# Patient Record
Sex: FEMALE | Race: OTHER | HISPANIC OR LATINO
[De-identification: names, ages, dates, MRNs, and addresses within clinical notes are randomized per-mention and may not be internally consistent; named-entity substitution may affect disease eponyms.]

---

## 2019-04-25 ENCOUNTER — RESULT REVIEW (OUTPATIENT)
Age: 74
End: 2019-04-25

## 2019-05-16 PROBLEM — Z00.00 ENCOUNTER FOR PREVENTIVE HEALTH EXAMINATION: Status: ACTIVE | Noted: 2019-05-16

## 2019-05-20 ENCOUNTER — APPOINTMENT (OUTPATIENT)
Dept: GYNECOLOGIC ONCOLOGY | Facility: CLINIC | Age: 74
End: 2019-05-20
Payer: MEDICARE

## 2019-05-20 VITALS
HEIGHT: 62 IN | DIASTOLIC BLOOD PRESSURE: 78 MMHG | BODY MASS INDEX: 29.81 KG/M2 | SYSTOLIC BLOOD PRESSURE: 148 MMHG | WEIGHT: 162 LBS | HEART RATE: 89 BPM

## 2019-05-20 DIAGNOSIS — E11.9 TYPE 2 DIABETES MELLITUS W/OUT COMPLICATIONS: ICD-10-CM

## 2019-05-20 DIAGNOSIS — Z86.19 PERSONAL HISTORY OF OTHER INFECTIOUS AND PARASITIC DISEASES: ICD-10-CM

## 2019-05-20 DIAGNOSIS — F17.200 NICOTINE DEPENDENCE, UNSPECIFIED, UNCOMPLICATED: ICD-10-CM

## 2019-05-20 DIAGNOSIS — Z86.69 PERSONAL HISTORY OF OTHER DISEASES OF THE NERVOUS SYSTEM AND SENSE ORGANS: ICD-10-CM

## 2019-05-20 PROCEDURE — 99205 OFFICE O/P NEW HI 60 MIN: CPT

## 2019-05-20 RX ORDER — GLIPIZIDE 2.5 MG/1
TABLET ORAL
Refills: 0 | Status: ACTIVE | COMMUNITY

## 2019-05-22 LAB
BUN SERPL-MCNC: 18 MG/DL
CREAT SERPL-MCNC: 0.84 MG/DL

## 2019-05-28 ENCOUNTER — OTHER (OUTPATIENT)
Age: 74
End: 2019-05-28

## 2019-05-31 ENCOUNTER — OUTPATIENT (OUTPATIENT)
Dept: OUTPATIENT SERVICES | Facility: HOSPITAL | Age: 74
LOS: 1 days | End: 2019-05-31
Payer: MEDICARE

## 2019-05-31 VITALS
OXYGEN SATURATION: 95 % | TEMPERATURE: 98 F | HEIGHT: 61 IN | SYSTOLIC BLOOD PRESSURE: 140 MMHG | DIASTOLIC BLOOD PRESSURE: 80 MMHG | RESPIRATION RATE: 16 BRPM | WEIGHT: 162.92 LBS | HEART RATE: 81 BPM

## 2019-05-31 DIAGNOSIS — Z98.891 HISTORY OF UTERINE SCAR FROM PREVIOUS SURGERY: Chronic | ICD-10-CM

## 2019-05-31 DIAGNOSIS — C54.1 MALIGNANT NEOPLASM OF ENDOMETRIUM: ICD-10-CM

## 2019-05-31 DIAGNOSIS — E11.9 TYPE 2 DIABETES MELLITUS WITHOUT COMPLICATIONS: ICD-10-CM

## 2019-05-31 DIAGNOSIS — Z92.89 PERSONAL HISTORY OF OTHER MEDICAL TREATMENT: Chronic | ICD-10-CM

## 2019-05-31 LAB
ALBUMIN SERPL ELPH-MCNC: 4.3 G/DL — SIGNIFICANT CHANGE UP (ref 3.3–5)
ALP SERPL-CCNC: 45 U/L — SIGNIFICANT CHANGE UP (ref 40–120)
ALT FLD-CCNC: 15 U/L — SIGNIFICANT CHANGE UP (ref 4–33)
ANION GAP SERPL CALC-SCNC: 15 MMO/L — HIGH (ref 7–14)
AST SERPL-CCNC: 15 U/L — SIGNIFICANT CHANGE UP (ref 4–32)
BILIRUB SERPL-MCNC: 0.4 MG/DL — SIGNIFICANT CHANGE UP (ref 0.2–1.2)
BLD GP AB SCN SERPL QL: NEGATIVE — SIGNIFICANT CHANGE UP
BUN SERPL-MCNC: 16 MG/DL — SIGNIFICANT CHANGE UP (ref 7–23)
CALCIUM SERPL-MCNC: 10.1 MG/DL — SIGNIFICANT CHANGE UP (ref 8.4–10.5)
CHLORIDE SERPL-SCNC: 96 MMOL/L — LOW (ref 98–107)
CO2 SERPL-SCNC: 23 MMOL/L — SIGNIFICANT CHANGE UP (ref 22–31)
CREAT SERPL-MCNC: 0.76 MG/DL — SIGNIFICANT CHANGE UP (ref 0.5–1.3)
GLUCOSE SERPL-MCNC: 159 MG/DL — HIGH (ref 70–99)
HBA1C BLD-MCNC: 6.7 % — HIGH (ref 4–5.6)
HCT VFR BLD CALC: 48.1 % — HIGH (ref 34.5–45)
HGB BLD-MCNC: 16 G/DL — HIGH (ref 11.5–15.5)
MCHC RBC-ENTMCNC: 31.9 PG — SIGNIFICANT CHANGE UP (ref 27–34)
MCHC RBC-ENTMCNC: 33.3 % — SIGNIFICANT CHANGE UP (ref 32–36)
MCV RBC AUTO: 95.8 FL — SIGNIFICANT CHANGE UP (ref 80–100)
NRBC # FLD: 0 K/UL — SIGNIFICANT CHANGE UP (ref 0–0)
PLATELET # BLD AUTO: 200 K/UL — SIGNIFICANT CHANGE UP (ref 150–400)
PMV BLD: 10.7 FL — SIGNIFICANT CHANGE UP (ref 7–13)
POTASSIUM SERPL-MCNC: 4.3 MMOL/L — SIGNIFICANT CHANGE UP (ref 3.5–5.3)
POTASSIUM SERPL-SCNC: 4.3 MMOL/L — SIGNIFICANT CHANGE UP (ref 3.5–5.3)
PROT SERPL-MCNC: 8.8 G/DL — HIGH (ref 6–8.3)
RBC # BLD: 5.02 M/UL — SIGNIFICANT CHANGE UP (ref 3.8–5.2)
RBC # FLD: 12.9 % — SIGNIFICANT CHANGE UP (ref 10.3–14.5)
RH IG SCN BLD-IMP: POSITIVE — SIGNIFICANT CHANGE UP
SODIUM SERPL-SCNC: 134 MMOL/L — LOW (ref 135–145)
WBC # BLD: 7.67 K/UL — SIGNIFICANT CHANGE UP (ref 3.8–10.5)
WBC # FLD AUTO: 7.67 K/UL — SIGNIFICANT CHANGE UP (ref 3.8–10.5)

## 2019-05-31 PROCEDURE — 93010 ELECTROCARDIOGRAM REPORT: CPT

## 2019-05-31 RX ORDER — SODIUM CHLORIDE 9 MG/ML
1000 INJECTION, SOLUTION INTRAVENOUS
Refills: 0 | Status: DISCONTINUED | OUTPATIENT
Start: 2019-06-11 | End: 2019-06-26

## 2019-05-31 NOTE — H&P PST ADULT - HISTORY OF PRESENT ILLNESS
74 y.o. female , with hx of diabetes, c/o postmenopausal bleeding in 04/2019 x 3 days, s/p GYN eval and biopsy, diagnosed with endometrial cancer, pt denies further episodes of bleeding, pelvic pain, weight loss or fatigue, presents to PST for evaluation for Robotic Assisted Total Laparoscopic Hysterectomy, Bilateral Salpingo Oophorectomy, Pelvic and Para Aortic Lymph Node Dissection, Omentectomy on 19

## 2019-05-31 NOTE — H&P PST ADULT - NSICDXPROBLEM_GEN_ALL_CORE_FT
PROBLEM DIAGNOSES  Problem: Endometrial cancer  Assessment and Plan: pt scheduled for Robotic Assisted Total Laparoscopic Hysterectomy, Bilateral Salpingo Oophorectomy, Pelvic and Para Aortic Lymph Node Dissection, Omentectomy on 06/11/19  Preop instructions provided. Pt verbalized understanding.   Pepcid for GI prophylaxis with written and verbal instruction provided    written and verbal instructions with teach back on chlorhexidine shampoo provided,  pt verbalized understanding with returned demonstration   s/p med eval 05/30/19 as per surgeon request, copy requested    Problem: Diabetes mellitus  Assessment and Plan: OR booking notified  pt instructed to hold Glipizide on the morning of the surgery

## 2019-05-31 NOTE — H&P PST ADULT - NEGATIVE MUSCULOSKELETAL SYMPTOMS
no muscle weakness/no back pain/no myalgia/no muscle cramps/no neck pain/no stiffness/no joint swelling/no arthritis

## 2019-05-31 NOTE — H&P PST ADULT - MUSCULOSKELETAL
detailed exam no joint erythema/normal strength/no calf tenderness/no joint swelling/ROM intact/no joint warmth details…

## 2019-06-10 ENCOUNTER — TRANSCRIPTION ENCOUNTER (OUTPATIENT)
Age: 74
End: 2019-06-10

## 2019-06-10 PROBLEM — E11.9 TYPE 2 DIABETES MELLITUS: Status: ACTIVE | Noted: 2019-06-10

## 2019-06-10 PROBLEM — Z86.69 HISTORY OF CATARACT: Status: RESOLVED | Noted: 2019-06-10 | Resolved: 2019-06-10

## 2019-06-10 PROBLEM — Z86.19 HISTORY OF HEPATITIS C VIRUS INFECTION: Status: RESOLVED | Noted: 2019-06-10 | Resolved: 2019-06-10

## 2019-06-10 NOTE — ASU PATIENT PROFILE, ADULT - PMH
Diabetes mellitus    Endometrial cancer    Hepatitis C  ~2017, treated Diabetes mellitus    Endometrial cancer    Hepatitis C  ~2017, treated  Smoker

## 2019-06-11 ENCOUNTER — RESULT REVIEW (OUTPATIENT)
Age: 74
End: 2019-06-11

## 2019-06-11 ENCOUNTER — APPOINTMENT (OUTPATIENT)
Dept: GYNECOLOGIC ONCOLOGY | Facility: HOSPITAL | Age: 74
End: 2019-06-11

## 2019-06-11 ENCOUNTER — OUTPATIENT (OUTPATIENT)
Dept: OUTPATIENT SERVICES | Facility: HOSPITAL | Age: 74
LOS: 1 days | Discharge: HOME | End: 2019-06-11
Payer: MEDICARE

## 2019-06-11 VITALS
SYSTOLIC BLOOD PRESSURE: 138 MMHG | TEMPERATURE: 98 F | HEART RATE: 78 BPM | OXYGEN SATURATION: 97 % | DIASTOLIC BLOOD PRESSURE: 78 MMHG | RESPIRATION RATE: 16 BRPM

## 2019-06-11 VITALS
TEMPERATURE: 98 F | HEIGHT: 61 IN | DIASTOLIC BLOOD PRESSURE: 76 MMHG | OXYGEN SATURATION: 100 % | RESPIRATION RATE: 16 BRPM | WEIGHT: 162.92 LBS | HEART RATE: 75 BPM | SYSTOLIC BLOOD PRESSURE: 141 MMHG

## 2019-06-11 DIAGNOSIS — C54.1 MALIGNANT NEOPLASM OF ENDOMETRIUM: ICD-10-CM

## 2019-06-11 DIAGNOSIS — Z92.89 PERSONAL HISTORY OF OTHER MEDICAL TREATMENT: Chronic | ICD-10-CM

## 2019-06-11 DIAGNOSIS — Z98.891 HISTORY OF UTERINE SCAR FROM PREVIOUS SURGERY: Chronic | ICD-10-CM

## 2019-06-11 LAB
BASE EXCESS BLDA CALC-SCNC: -1.8 MMOL/L — SIGNIFICANT CHANGE UP
BASE EXCESS BLDA CALC-SCNC: -3.2 MMOL/L — SIGNIFICANT CHANGE UP
BASOPHILS # BLD AUTO: 0.06 K/UL — SIGNIFICANT CHANGE UP (ref 0–0.2)
BASOPHILS NFR BLD AUTO: 0.6 % — SIGNIFICANT CHANGE UP (ref 0–2)
CA-I BLDA-SCNC: 1.16 MMOL/L — SIGNIFICANT CHANGE UP (ref 1.15–1.29)
CA-I BLDA-SCNC: 1.19 MMOL/L — SIGNIFICANT CHANGE UP (ref 1.15–1.29)
EOSINOPHIL # BLD AUTO: 0.04 K/UL — SIGNIFICANT CHANGE UP (ref 0–0.5)
EOSINOPHIL NFR BLD AUTO: 0.4 % — SIGNIFICANT CHANGE UP (ref 0–6)
GLUCOSE BLDA-MCNC: 145 MG/DL — HIGH (ref 70–99)
GLUCOSE BLDA-MCNC: 160 MG/DL — HIGH (ref 70–99)
GLUCOSE BLDC GLUCOMTR-MCNC: 160 MG/DL — HIGH (ref 70–99)
HCO3 BLDA-SCNC: 22 MMOL/L — SIGNIFICANT CHANGE UP (ref 22–26)
HCO3 BLDA-SCNC: 24 MMOL/L — SIGNIFICANT CHANGE UP (ref 22–26)
HCT VFR BLD CALC: 43.7 % — SIGNIFICANT CHANGE UP (ref 34.5–45)
HCT VFR BLDA CALC: 46 % — SIGNIFICANT CHANGE UP (ref 34.5–46.5)
HCT VFR BLDA CALC: 46.6 % — HIGH (ref 34.5–46.5)
HGB BLD-MCNC: 14.8 G/DL — SIGNIFICANT CHANGE UP (ref 11.5–15.5)
HGB BLDA-MCNC: 15 G/DL — SIGNIFICANT CHANGE UP (ref 11.5–15.5)
HGB BLDA-MCNC: 15.2 G/DL — SIGNIFICANT CHANGE UP (ref 11.5–15.5)
IMM GRANULOCYTES NFR BLD AUTO: 0.2 % — SIGNIFICANT CHANGE UP (ref 0–1.5)
LYMPHOCYTES # BLD AUTO: 1.89 K/UL — SIGNIFICANT CHANGE UP (ref 1–3.3)
LYMPHOCYTES # BLD AUTO: 18.6 % — SIGNIFICANT CHANGE UP (ref 13–44)
MCHC RBC-ENTMCNC: 31.9 PG — SIGNIFICANT CHANGE UP (ref 27–34)
MCHC RBC-ENTMCNC: 33.9 % — SIGNIFICANT CHANGE UP (ref 32–36)
MCV RBC AUTO: 94.2 FL — SIGNIFICANT CHANGE UP (ref 80–100)
MONOCYTES # BLD AUTO: 0.49 K/UL — SIGNIFICANT CHANGE UP (ref 0–0.9)
MONOCYTES NFR BLD AUTO: 4.8 % — SIGNIFICANT CHANGE UP (ref 2–14)
NEUTROPHILS # BLD AUTO: 7.65 K/UL — HIGH (ref 1.8–7.4)
NEUTROPHILS NFR BLD AUTO: 75.4 % — SIGNIFICANT CHANGE UP (ref 43–77)
NRBC # FLD: 0 K/UL — SIGNIFICANT CHANGE UP (ref 0–0)
PCO2 BLDA: 33 MMHG — SIGNIFICANT CHANGE UP (ref 32–48)
PCO2 BLDA: 40 MMHG — SIGNIFICANT CHANGE UP (ref 32–48)
PH BLDA: 7.35 PH — SIGNIFICANT CHANGE UP (ref 7.35–7.45)
PH BLDA: 7.43 PH — SIGNIFICANT CHANGE UP (ref 7.35–7.45)
PLATELET # BLD AUTO: 175 K/UL — SIGNIFICANT CHANGE UP (ref 150–400)
PMV BLD: 10.4 FL — SIGNIFICANT CHANGE UP (ref 7–13)
PO2 BLDA: 254 MMHG — HIGH (ref 83–108)
PO2 BLDA: 261 MMHG — HIGH (ref 83–108)
POTASSIUM BLDA-SCNC: 4 MMOL/L — SIGNIFICANT CHANGE UP (ref 3.4–4.5)
POTASSIUM BLDA-SCNC: 4.2 MMOL/L — SIGNIFICANT CHANGE UP (ref 3.4–4.5)
RBC # BLD: 4.64 M/UL — SIGNIFICANT CHANGE UP (ref 3.8–5.2)
RBC # FLD: 12.6 % — SIGNIFICANT CHANGE UP (ref 10.3–14.5)
RH IG SCN BLD-IMP: POSITIVE — SIGNIFICANT CHANGE UP
SAO2 % BLDA: 99.6 % — HIGH (ref 95–99)
SAO2 % BLDA: 99.8 % — HIGH (ref 95–99)
SODIUM BLDA-SCNC: 135 MMOL/L — LOW (ref 136–146)
SODIUM BLDA-SCNC: 135 MMOL/L — LOW (ref 136–146)
WBC # BLD: 10.15 K/UL — SIGNIFICANT CHANGE UP (ref 3.8–10.5)
WBC # FLD AUTO: 10.15 K/UL — SIGNIFICANT CHANGE UP (ref 3.8–10.5)

## 2019-06-11 PROCEDURE — 88112 CYTOPATH CELL ENHANCE TECH: CPT | Mod: 26

## 2019-06-11 PROCEDURE — 38572 LAPAROSCOPY LYMPHADENECTOMY: CPT | Mod: GC

## 2019-06-11 PROCEDURE — 88309 TISSUE EXAM BY PATHOLOGIST: CPT | Mod: 26

## 2019-06-11 PROCEDURE — 88342 IMHCHEM/IMCYTCHM 1ST ANTB: CPT | Mod: 26

## 2019-06-11 PROCEDURE — S2900 ROBOTIC SURGICAL SYSTEM: CPT | Mod: NC

## 2019-06-11 PROCEDURE — 88341 IMHCHEM/IMCYTCHM EA ADD ANTB: CPT | Mod: 26

## 2019-06-11 PROCEDURE — 88305 TISSUE EXAM BY PATHOLOGIST: CPT | Mod: 26

## 2019-06-11 PROCEDURE — 58571 TLH W/T/O 250 G OR LESS: CPT | Mod: GC

## 2019-06-11 RX ORDER — SODIUM CHLORIDE 9 MG/ML
1000 INJECTION, SOLUTION INTRAVENOUS
Refills: 0 | Status: DISCONTINUED | OUTPATIENT
Start: 2019-06-11 | End: 2019-06-26

## 2019-06-11 RX ORDER — OXYCODONE HYDROCHLORIDE 5 MG/1
1 TABLET ORAL
Qty: 12 | Refills: 0
Start: 2019-06-11 | End: 2019-06-13

## 2019-06-11 RX ORDER — FENTANYL CITRATE 50 UG/ML
25 INJECTION INTRAVENOUS
Refills: 0 | Status: DISCONTINUED | OUTPATIENT
Start: 2019-06-11 | End: 2019-06-11

## 2019-06-11 RX ORDER — ONDANSETRON 8 MG/1
4 TABLET, FILM COATED ORAL ONCE
Refills: 0 | Status: DISCONTINUED | OUTPATIENT
Start: 2019-06-11 | End: 2019-06-26

## 2019-06-11 RX ORDER — OXYCODONE AND ACETAMINOPHEN 5; 325 MG/1; MG/1
1 TABLET ORAL ONCE
Refills: 0 | Status: DISCONTINUED | OUTPATIENT
Start: 2019-06-11 | End: 2019-06-11

## 2019-06-11 RX ADMIN — SODIUM CHLORIDE 125 MILLILITER(S): 9 INJECTION, SOLUTION INTRAVENOUS at 14:27

## 2019-06-11 NOTE — ASU DISCHARGE PLAN (ADULT/PEDIATRIC) - CARE PROVIDER_API CALL
Mildred Gonzalez (MD)  Gynecologic Oncology; Obstetrics and Gynecology  [facility], [address] [place] [address]  Phone: [phone]  Fax: [phone]  Follow Up Time: 2 weeks

## 2019-06-11 NOTE — BRIEF OPERATIVE NOTE - SPECIMENS
uterus, cervix, bilateral ovaries and fallopian tubes, pelvic and para-aortic lymph nodes, omental biopsy

## 2019-06-11 NOTE — ASU DISCHARGE PLAN (ADULT/PEDIATRIC) - CALL YOUR DOCTOR IF YOU HAVE ANY OF THE FOLLOWING:
Nausea and vomiting that does not stop/Inability to tolerate liquids or foods/Pain not relieved by Medications/Fever greater than (need to indicate Fahrenheit or Celsius)/Bleeding that does not stop

## 2019-06-11 NOTE — ASU DISCHARGE PLAN (ADULT/PEDIATRIC) - ACTIVITY LEVEL
No excercise/No tub baths/Nothing per vagina/No douching/Nothing per rectum/No intercourse/No heavy lifting/6 weeks/No tampons

## 2019-06-11 NOTE — PROGRESS NOTE ADULT - SUBJECTIVE AND OBJECTIVE BOX
PA GYN/ONC POST OP NOTE:    Pt seen in PACU, resting comfortably, without any complaints. Pt becoming more awake now and requesting something to drink. Eagle catheter was just D/C'd and is DTV and then for possible D/C home later.    Vital Signs Last 24 Hrs  T(C): 36.6 (11 Jun 2019 14:45), Max: 36.6 (11 Jun 2019 06:42)  T(F): 97.8 (11 Jun 2019 14:45), Max: 97.9 (11 Jun 2019 06:42)  HR: 86 (11 Jun 2019 15:45) (75 - 102)  BP: 134/63 (11 Jun 2019 15:45) (112/47 - 161/68)  BP(mean): 81 (11 Jun 2019 15:45) (63 - 90)  RR: 15 (11 Jun 2019 15:45) (11 - 17)  SpO2: 98% (11 Jun 2019 15:45) (93% - 100%)    U/O:    I&O's Detail    11 Jun 2019 07:01  -  11 Jun 2019 15:57  --------------------------------------------------------  IN:    lactated ringers.: 375 mL  Total IN: 375 mL    OUT:    Indwelling Catheter - Urethral: 300 mL  Total OUT: 300 mL    Total NET: 75 mL    PHYSICAL EXAM:  CHEST/LUNG: CTA B/L  HEART: S1S2 RRR  ABDOMEN: Soft, Appropriately tender   INCISION: Scope sites C/D/I  EXTREMITIES: NT B/L, Pt has Venodynes for DVT ppx     LABS:                        14.8   10.15 )-----------( 175      ( 11 Jun 2019 15:06 )             43.7     MEDICATIONS  (STANDING):  lactated ringers. 1000 milliLiter(s) (30 mL/Hr) IV Continuous <Continuous>  lactated ringers. 1000 milliLiter(s) (125 mL/Hr) IV Continuous <Continuous>    MEDICATIONS  (PRN):  fentaNYL    Injectable 25 MICROGram(s) IV Push every 5 minutes PRN Moderate Pain (4 - 6)  ondansetron Injectable 4 milliGRAM(s) IV Push once PRN Nausea and/or Vomiting  oxyCODONE    5 mG/acetaminophen 325 mG 1 Tablet(s) Oral once PRN Moderate Pain (4 - 6) PA GYN/ONC POST OP NOTE:    Pt seen in PACU, resting comfortably, without any complaints. Pt becoming more awake now and requesting something to drink. Eagle catheter was just D/C'd and is DTV and then for possible D/C home later.    Vital Signs Last 24 Hrs  T(C): 36.6 (11 Jun 2019 14:45), Max: 36.6 (11 Jun 2019 06:42)  T(F): 97.8 (11 Jun 2019 14:45), Max: 97.9 (11 Jun 2019 06:42)  HR: 86 (11 Jun 2019 15:45) (75 - 102)  BP: 134/63 (11 Jun 2019 15:45) (112/47 - 161/68)  BP(mean): 81 (11 Jun 2019 15:45) (63 - 90)  RR: 15 (11 Jun 2019 15:45) (11 - 17)  SpO2: 98% (11 Jun 2019 15:45) (93% - 100%)    U/O:    I&O's Detail    11 Jun 2019 07:01  -  11 Jun 2019 15:57  --------------------------------------------------------  IN:    lactated ringers.: 375 mL  Total IN: 375 mL    OUT:    Indwelling Catheter - Urethral: 300 mL  Total OUT: 300 mL    Total NET: 75 mL    PHYSICAL EXAM:  CHEST/LUNG: CTA B/L  HEART: S1S2 RRR  ABDOMEN: Soft, Appropriately tender   INCISION: Scope sites C/D/I  PELVIC: No vaginal bleeding  EXTREMITIES: NT B/L, Pt has Venodynes for DVT ppx     LABS:                        14.8   10.15 )-----------( 175      ( 11 Jun 2019 15:06 )             43.7     MEDICATIONS  (STANDING):  lactated ringers. 1000 milliLiter(s) (30 mL/Hr) IV Continuous <Continuous>  lactated ringers. 1000 milliLiter(s) (125 mL/Hr) IV Continuous <Continuous>    MEDICATIONS  (PRN):  fentaNYL    Injectable 25 MICROGram(s) IV Push every 5 minutes PRN Moderate Pain (4 - 6)  ondansetron Injectable 4 milliGRAM(s) IV Push once PRN Nausea and/or Vomiting  oxyCODONE    5 mG/acetaminophen 325 mG 1 Tablet(s) Oral once PRN Moderate Pain (4 - 6)

## 2019-06-11 NOTE — ASU DISCHARGE PLAN (ADULT/PEDIATRIC) - PROCEDURE
exam under anesthesia, robotic-assisted total laproscopic hysterectomy, bilateral salpingooopherectomy, paraarotic and pelvic lymph node dissection, omentectomy, repair of vaginal laceration

## 2019-06-11 NOTE — BRIEF OPERATIVE NOTE - NSICDXBRIEFPROCEDURE_GEN_ALL_CORE_FT
PROCEDURES:  Robot-assisted total hysterectomy with lymphadenectomy 11-Jun-2019 12:41:14  Mildred Gonzalez

## 2019-06-11 NOTE — BRIEF OPERATIVE NOTE - OPERATION/FINDINGS
8 week size uterus, grossly normal ovaries and fallopian tubes, mildly enlarged left obturator lymph nodes, no enlarged right pelvic or para-aortic lymph nodes, appendix and upper abdomen unremarkable

## 2019-06-11 NOTE — ASU DISCHARGE PLAN (ADULT/PEDIATRIC) - ASU DC SPECIAL INSTRUCTIONSFT
Follow up with your doctor at your appointment on 6/24/19 at 11am.     Regular diet. Resume normal activity as tolerated. No heavy lifting, driving, or strenuous activity for 6 weeks. Do not put anything in the vagina for 2 weeks--no douching, tampons, or intercourse. No tub baths for 2 weeks. Call your doctor with any signs and symptoms of infection such as fever, chills, nausea or vomiting. Call your doctor with redness or swelling at the incision site, fluid leakage or wound separation. Call your doctor if you're unable to tolerate food or have difficulty urinating. Call your doctor if you have pain that is not relieved by your prescribed medications. Notify your doctor with any other concerns. Follow up with Dr. Gonzalez in 2 weeks.

## 2019-06-11 NOTE — PROGRESS NOTE ADULT - ASSESSMENT
A/P: 75 Y/O S/P: Robotic TLH, BSO, PPALND, BILL, Vaginal Laceration Repair  Pt stable, and for D/C home once ambulates, tolerating some regular diet, voiding adequately and meeting PACU criteria.

## 2019-06-14 LAB — NON-GYNECOLOGICAL CYTOLOGY STUDY: SIGNIFICANT CHANGE UP

## 2019-06-24 ENCOUNTER — APPOINTMENT (OUTPATIENT)
Dept: GYNECOLOGIC ONCOLOGY | Facility: CLINIC | Age: 74
End: 2019-06-24
Payer: MEDICARE

## 2019-06-24 VITALS
DIASTOLIC BLOOD PRESSURE: 77 MMHG | HEART RATE: 76 BPM | HEIGHT: 62 IN | BODY MASS INDEX: 30.55 KG/M2 | TEMPERATURE: 98.2 F | SYSTOLIC BLOOD PRESSURE: 145 MMHG | WEIGHT: 166 LBS

## 2019-06-24 DIAGNOSIS — Z71.9 COUNSELING, UNSPECIFIED: ICD-10-CM

## 2019-06-24 PROBLEM — B19.20 UNSPECIFIED VIRAL HEPATITIS C WITHOUT HEPATIC COMA: Chronic | Status: ACTIVE | Noted: 2019-05-31

## 2019-06-24 PROBLEM — E11.9 TYPE 2 DIABETES MELLITUS WITHOUT COMPLICATIONS: Chronic | Status: ACTIVE | Noted: 2019-05-31

## 2019-06-24 PROBLEM — C54.1 MALIGNANT NEOPLASM OF ENDOMETRIUM: Chronic | Status: ACTIVE | Noted: 2019-05-31

## 2019-06-24 PROBLEM — F17.200 NICOTINE DEPENDENCE, UNSPECIFIED, UNCOMPLICATED: Chronic | Status: ACTIVE | Noted: 2019-06-11

## 2019-06-24 LAB — SURGICAL PATHOLOGY STUDY: SIGNIFICANT CHANGE UP

## 2019-06-24 PROCEDURE — 99213 OFFICE O/P EST LOW 20 MIN: CPT | Mod: 24

## 2019-06-24 PROCEDURE — 99024 POSTOP FOLLOW-UP VISIT: CPT

## 2019-07-08 ENCOUNTER — OUTPATIENT (OUTPATIENT)
Dept: OUTPATIENT SERVICES | Facility: HOSPITAL | Age: 74
LOS: 1 days | Discharge: HOME | End: 2019-07-08
Payer: MEDICARE

## 2019-07-08 DIAGNOSIS — Z98.891 HISTORY OF UTERINE SCAR FROM PREVIOUS SURGERY: Chronic | ICD-10-CM

## 2019-07-08 DIAGNOSIS — Z92.89 PERSONAL HISTORY OF OTHER MEDICAL TREATMENT: Chronic | ICD-10-CM

## 2019-07-09 ENCOUNTER — EMERGENCY (EMERGENCY)
Facility: HOSPITAL | Age: 74
LOS: 1 days | Discharge: HOME | End: 2019-07-09
Attending: STUDENT IN AN ORGANIZED HEALTH CARE EDUCATION/TRAINING PROGRAM | Admitting: STUDENT IN AN ORGANIZED HEALTH CARE EDUCATION/TRAINING PROGRAM
Payer: MEDICARE

## 2019-07-09 VITALS
DIASTOLIC BLOOD PRESSURE: 58 MMHG | HEART RATE: 85 BPM | TEMPERATURE: 98 F | OXYGEN SATURATION: 98 % | SYSTOLIC BLOOD PRESSURE: 125 MMHG | RESPIRATION RATE: 18 BRPM

## 2019-07-09 VITALS
TEMPERATURE: 99 F | HEART RATE: 76 BPM | OXYGEN SATURATION: 98 % | RESPIRATION RATE: 17 BRPM | SYSTOLIC BLOOD PRESSURE: 150 MMHG | DIASTOLIC BLOOD PRESSURE: 85 MMHG

## 2019-07-09 DIAGNOSIS — Z98.891 HISTORY OF UTERINE SCAR FROM PREVIOUS SURGERY: Chronic | ICD-10-CM

## 2019-07-09 DIAGNOSIS — N39.0 URINARY TRACT INFECTION, SITE NOT SPECIFIED: ICD-10-CM

## 2019-07-09 DIAGNOSIS — Z92.89 PERSONAL HISTORY OF OTHER MEDICAL TREATMENT: Chronic | ICD-10-CM

## 2019-07-09 LAB
ALBUMIN SERPL ELPH-MCNC: 3.6 G/DL — SIGNIFICANT CHANGE UP (ref 3.3–5)
ALP SERPL-CCNC: 47 U/L — SIGNIFICANT CHANGE UP (ref 40–120)
ALT FLD-CCNC: 20 U/L — SIGNIFICANT CHANGE UP (ref 4–33)
ANION GAP SERPL CALC-SCNC: 14 MMO/L — SIGNIFICANT CHANGE UP (ref 7–14)
APPEARANCE UR: SIGNIFICANT CHANGE UP
AST SERPL-CCNC: 21 U/L — SIGNIFICANT CHANGE UP (ref 4–32)
BACTERIA # UR AUTO: SIGNIFICANT CHANGE UP
BASOPHILS # BLD AUTO: 0.07 K/UL — SIGNIFICANT CHANGE UP (ref 0–0.2)
BASOPHILS NFR BLD AUTO: 0.9 % — SIGNIFICANT CHANGE UP (ref 0–2)
BILIRUB SERPL-MCNC: 0.4 MG/DL — SIGNIFICANT CHANGE UP (ref 0.2–1.2)
BILIRUB UR-MCNC: NEGATIVE — SIGNIFICANT CHANGE UP
BLOOD UR QL VISUAL: SIGNIFICANT CHANGE UP
BUN SERPL-MCNC: 15 MG/DL — SIGNIFICANT CHANGE UP (ref 7–23)
CALCIUM SERPL-MCNC: 9.6 MG/DL — SIGNIFICANT CHANGE UP (ref 8.4–10.5)
CHLORIDE SERPL-SCNC: 96 MMOL/L — LOW (ref 98–107)
CO2 SERPL-SCNC: 23 MMOL/L — SIGNIFICANT CHANGE UP (ref 22–31)
COLOR SPEC: YELLOW — SIGNIFICANT CHANGE UP
CREAT SERPL-MCNC: 0.78 MG/DL — SIGNIFICANT CHANGE UP (ref 0.5–1.3)
EOSINOPHIL # BLD AUTO: 0.2 K/UL — SIGNIFICANT CHANGE UP (ref 0–0.5)
EOSINOPHIL NFR BLD AUTO: 2.5 % — SIGNIFICANT CHANGE UP (ref 0–6)
GLUCOSE SERPL-MCNC: 149 MG/DL — HIGH (ref 70–99)
GLUCOSE UR-MCNC: NEGATIVE — SIGNIFICANT CHANGE UP
HCT VFR BLD CALC: 44.9 % — SIGNIFICANT CHANGE UP (ref 34.5–45)
HGB BLD-MCNC: 14.7 G/DL — SIGNIFICANT CHANGE UP (ref 11.5–15.5)
HYALINE CASTS # UR AUTO: NEGATIVE — SIGNIFICANT CHANGE UP
IMM GRANULOCYTES NFR BLD AUTO: 0.4 % — SIGNIFICANT CHANGE UP (ref 0–1.5)
KETONES UR-MCNC: NEGATIVE — SIGNIFICANT CHANGE UP
LEUKOCYTE ESTERASE UR-ACNC: SIGNIFICANT CHANGE UP
LYMPHOCYTES # BLD AUTO: 2.03 K/UL — SIGNIFICANT CHANGE UP (ref 1–3.3)
LYMPHOCYTES # BLD AUTO: 25.2 % — SIGNIFICANT CHANGE UP (ref 13–44)
MCHC RBC-ENTMCNC: 31.3 PG — SIGNIFICANT CHANGE UP (ref 27–34)
MCHC RBC-ENTMCNC: 32.7 % — SIGNIFICANT CHANGE UP (ref 32–36)
MCV RBC AUTO: 95.5 FL — SIGNIFICANT CHANGE UP (ref 80–100)
MONOCYTES # BLD AUTO: 0.62 K/UL — SIGNIFICANT CHANGE UP (ref 0–0.9)
MONOCYTES NFR BLD AUTO: 7.7 % — SIGNIFICANT CHANGE UP (ref 2–14)
NEUTROPHILS # BLD AUTO: 5.09 K/UL — SIGNIFICANT CHANGE UP (ref 1.8–7.4)
NEUTROPHILS NFR BLD AUTO: 63.3 % — SIGNIFICANT CHANGE UP (ref 43–77)
NITRITE UR-MCNC: POSITIVE — HIGH
NRBC # FLD: 0 K/UL — SIGNIFICANT CHANGE UP (ref 0–0)
PH UR: 6.5 — SIGNIFICANT CHANGE UP (ref 5–8)
PLATELET # BLD AUTO: 295 K/UL — SIGNIFICANT CHANGE UP (ref 150–400)
PMV BLD: 10.4 FL — SIGNIFICANT CHANGE UP (ref 7–13)
POTASSIUM SERPL-MCNC: 3.9 MMOL/L — SIGNIFICANT CHANGE UP (ref 3.5–5.3)
POTASSIUM SERPL-SCNC: 3.9 MMOL/L — SIGNIFICANT CHANGE UP (ref 3.5–5.3)
PROT SERPL-MCNC: 9.3 G/DL — HIGH (ref 6–8.3)
PROT UR-MCNC: 20 — SIGNIFICANT CHANGE UP
RBC # BLD: 4.7 M/UL — SIGNIFICANT CHANGE UP (ref 3.8–5.2)
RBC # FLD: 12.3 % — SIGNIFICANT CHANGE UP (ref 10.3–14.5)
RBC CASTS # UR COMP ASSIST: SIGNIFICANT CHANGE UP (ref 0–?)
SODIUM SERPL-SCNC: 133 MMOL/L — LOW (ref 135–145)
SP GR SPEC: 1.01 — SIGNIFICANT CHANGE UP (ref 1–1.04)
SQUAMOUS # UR AUTO: SIGNIFICANT CHANGE UP
UROBILINOGEN FLD QL: NORMAL — SIGNIFICANT CHANGE UP
WBC # BLD: 8.04 K/UL — SIGNIFICANT CHANGE UP (ref 3.8–10.5)
WBC # FLD AUTO: 8.04 K/UL — SIGNIFICANT CHANGE UP (ref 3.8–10.5)
WBC UR QL: >50 — HIGH (ref 0–?)

## 2019-07-09 PROCEDURE — 99283 EMERGENCY DEPT VISIT LOW MDM: CPT

## 2019-07-09 RX ORDER — CEPHALEXIN 500 MG
1 CAPSULE ORAL
Qty: 10 | Refills: 0
Start: 2019-07-09 | End: 2019-07-13

## 2019-07-09 RX ORDER — AZTREONAM 2 G
1 VIAL (EA) INJECTION
Qty: 14 | Refills: 0
Start: 2019-07-09 | End: 2019-07-15

## 2019-07-09 NOTE — CONSULT NOTE ADULT - SUBJECTIVE AND OBJECTIVE BOX
R2 GYN ONC CONSULT NOTE    74y with 1A G3 endometrial cancer s/p RA Total Laparoscopic Hysterectomy Bilateral Salpingo-oophorectomy, pelvic and para-aortic lymph node dissection and omentectomy  with several days of subjective fevers and urinary frequency. Febrile 3d ago to 100.4, otherwise no objective fevers. Afebrile here. Associated with weakness, lightheadedness. No chest pain, shortness of breath, cough, calf pain. No abdominal pain, vaginal discharge, vaginal bleeding. No dysuria or hematuria. No diarrhea or constipation. Patient has initial appointment with radiation oncologist (Dr. Laura) .    OB/GYN HISTORY:     Surgical History:    History of   History of endometrial biopsy  hysterectomy as above.      Past Medical History:   Smoker  Hepatitis C  Endometrial cancer  Diabetes mellitus      No Known Allergies          FAMILY HISTORY:  No pertinent family history in first degree relatives      Social History:     REVIEW OF SYSTEMS:    CONSTITUTIONAL: no weight loss  EYES: No eye pain, visual disturbances, or discharge  ENMT:  No difficulty hearing, tinnitus, vertigo; No sinus or throat pain  NECK: No pain or stiffness  BREASTS: No pain, masses, or nipple discharge  RESPIRATORY: No cough, wheezing, chills or hemoptysis; No shortness of breath  CARDIOVASCULAR: No chest pain, palpitations, dizziness, or leg swelling  GASTROINTESTINAL: No abdominal or epigastric pain. No nausea, vomiting, or hematemesis; No diarrhea or constipation. No melena or hematochezia.  GENITOURINARY: No dysuria, hematuria, or incontinence  NEUROLOGICAL: No headaches, memory loss, loss of strength, numbness, or tremors  SKIN: No itching, burning, rashes, or lesions   LYMPH NODES: No enlarged glands  ENDOCRINE: No heat or cold intolerance; No hair loss  MUSCULOSKELETAL: No joint pain or swelling; No muscle, back, or extremity pain  PSYCHIATRIC: No depression, anxiety, mood swings, or difficulty sleeping  HEME/LYMPH: No easy bruising, or bleeding gums  ALLERY AND IMMUNOLOGIC: No hives or eczema      MEDICATIONS  (STANDING):    MEDICATIONS  (PRN):      OBJECTIVE FINDINGS:    Vital Signs Last 24 Hrs  T(C): 37.3 (2019 21:09), Max: 37.3 (2019 21:09)  T(F): 99.2 (2019 21:09), Max: 99.2 (2019 21:09)  HR: 76 (2019 21:09) (76 - 85)  BP: 150/85 (2019 21:09) (125/58 - 150/85)  BP(mean): --  RR: 17 (2019 21:09) (17 - 18)  SpO2: 98% (2019 21:09) (98% - 98%)     PE:  Gen: Comfortable, NAD  CV: RRR  Pulm: CTAB  Abd: Soft, NT. Laproscopic port sites well healed  Ext: No edema or tenderness bilaterally  Spec Exam: deferred with intent    LABS:                        14.7   8.04  )-----------( 295      ( 2019 19:50 )             44.9     07-09    133<L>  |  96<L>  |  15  ----------------------------<  149<H>  3.9   |  23  |  0.78    Ca    9.6      2019 19:50    TPro  9.3<H>  /  Alb  3.6  /  TBili  0.4  /  DBili  x   /  AST  21  /  ALT  20  /  AlkPhos  47  07-09      Urinalysis Basic - ( 2019 21:46 )    Color: YELLOW / Appearance: Lt TURBID / S.014 / pH: 6.5  Gluc: NEGATIVE / Ketone: NEGATIVE  / Bili: NEGATIVE / Urobili: NORMAL   Blood: SMALL / Protein: 20 / Nitrite: POSITIVE   Leuk Esterase: LARGE / RBC: 0-2 / WBC >50   Sq Epi: FEW / Non Sq Epi: x / Bacteria: FEW        VWhite PGY-2

## 2019-07-09 NOTE — ED PROVIDER NOTE - CLINICAL SUMMARY MEDICAL DECISION MAKING FREE TEXT BOX
74F s/p PA 1 month ago for endometrial Ca now with subjective fevers (Tm 100.4 at home). Afebrile here, no fevers at home with temp range . Will check basic labs and ua, no clinical evidence of PNA. OB Gyn to see patient (called her doctor's office, they sent resident to see patient. plan anticipated dc

## 2019-07-09 NOTE — CONSULT NOTE ADULT - PROBLEM SELECTOR RECOMMENDATION 9
- Recommend bactrim rather than keflex for UTI--Rx sent to pharmacy and patient contacted by phone to discuss plan. Patient agrees with plan, understands need to take all antibiotics as prescribed, and has an appointment with rad-onc next week.  - Strict return precautions reviewed including signs of worsening infection.    d/w Kathy PGY4  Dorina Churchill PGY2

## 2019-07-09 NOTE — ED PROVIDER NOTE - PROGRESS NOTE DETAILS
Pt seen by OB (gyn onc patient) and cleared from their standpoint. Has appoint with rad onc. UA positive, will send home with keflex

## 2019-07-09 NOTE — ED ADULT NURSE NOTE - OBJECTIVE STATEMENT
Receive pt. in Intake 10c alert and oriented x 4 presenting tot he ER with complaints of fever. Pt. have a history of Endometrial cancer, DM, Hep C and stated " I have been having a fever since Sunday I took tylenol and it goes away but return". Pt. verbalized that her highest temperature was 100.4. Labs sent will continue to monitor, no c/o pain.

## 2019-07-09 NOTE — ED ADULT NURSE REASSESSMENT NOTE - NS ED NURSE REASSESS COMMENT FT1
received pt A&Ox3 absent any distress or C/O pain. able to make needs know with care provided PRN. will continue with current plan of care UA and dispo pending

## 2019-07-09 NOTE — ED PROVIDER NOTE - CHPI ED SYMPTOMS NEG
no vomiting/no decreased eating/drinking/no tingling/no numbness/no pain/no chills/no dizziness/no nausea

## 2019-07-09 NOTE — CONSULT NOTE ADULT - ASSESSMENT
A/P: 74y with 1A G3 endometrial cancer s/p RA Total Laparoscopic Hysterectomy Bilateral Salpingo-oophorectomy, pelvic and para-aortic lymph node dissection and omentectomy 6/11 with several days of subjective fevers and urinary frequency consistent with uti seen on UA. Patient is clinically well, hemodynamically stable. Good candidate for outpatient management. No signs or symptoms of SSI.

## 2019-07-09 NOTE — ED PROVIDER NOTE - OBJECTIVE STATEMENT
74F h/o DM p/w 4 days of reported fevers. Tm 100.4 yesterday, temps otherwise 99. She reports feeling hot and tired when she has these temperatures. Reports some discomfort with urination. Denies cough, chest pain, SOB, abd pain, n/v/d. S/p PA ~1month ago. She had been feeling well after the procedure, went home same day. Had follow-up with Dr. Barrera (gyn) already without issue. Reports no redness/oozing from incision sites. No vaginal discharge. Called Gyn office and states the nurse told her to go to the ER to be assessed.

## 2019-07-18 ENCOUNTER — APPOINTMENT (OUTPATIENT)
Dept: RADIATION ONCOLOGY | Facility: CLINIC | Age: 74
End: 2019-07-18
Payer: MEDICARE

## 2019-07-18 VITALS
WEIGHT: 158.29 LBS | RESPIRATION RATE: 16 BRPM | BODY MASS INDEX: 28.95 KG/M2 | SYSTOLIC BLOOD PRESSURE: 118 MMHG | DIASTOLIC BLOOD PRESSURE: 78 MMHG | OXYGEN SATURATION: 94 % | HEART RATE: 83 BPM

## 2019-07-18 DIAGNOSIS — C54.1 MALIGNANT NEOPLASM OF ENDOMETRIUM: ICD-10-CM

## 2019-07-18 PROCEDURE — 99214 OFFICE O/P EST MOD 30 MIN: CPT

## 2019-07-18 NOTE — REASON FOR VISIT
[Consideration of Curative Therapy] : consideration of curative therapy for [Endometrial Cancer] : endometrial cancer [Family Member] : family member

## 2019-07-22 ENCOUNTER — APPOINTMENT (OUTPATIENT)
Dept: GYNECOLOGIC ONCOLOGY | Facility: CLINIC | Age: 74
End: 2019-07-22
Payer: MEDICARE

## 2019-07-22 VITALS
HEIGHT: 62 IN | SYSTOLIC BLOOD PRESSURE: 146 MMHG | DIASTOLIC BLOOD PRESSURE: 75 MMHG | BODY MASS INDEX: 29.08 KG/M2 | WEIGHT: 158 LBS | HEART RATE: 83 BPM | TEMPERATURE: 98.8 F

## 2019-07-22 PROCEDURE — 99024 POSTOP FOLLOW-UP VISIT: CPT

## 2019-07-23 LAB
BASOPHILS # BLD AUTO: 0.05 K/UL
BASOPHILS NFR BLD AUTO: 0.6 %
EOSINOPHIL # BLD AUTO: 0.07 K/UL
EOSINOPHIL NFR BLD AUTO: 0.8 %
HCT VFR BLD CALC: 43.2 %
HGB BLD-MCNC: 13.4 G/DL
IMM GRANULOCYTES NFR BLD AUTO: 0.4 %
LYMPHOCYTES # BLD AUTO: 1.52 K/UL
LYMPHOCYTES NFR BLD AUTO: 17.9 %
MAN DIFF?: NORMAL
MCHC RBC-ENTMCNC: 29.9 PG
MCHC RBC-ENTMCNC: 31 GM/DL
MCV RBC AUTO: 96.4 FL
MONOCYTES # BLD AUTO: 0.66 K/UL
MONOCYTES NFR BLD AUTO: 7.8 %
NEUTROPHILS # BLD AUTO: 6.17 K/UL
NEUTROPHILS NFR BLD AUTO: 72.5 %
PLATELET # BLD AUTO: 375 K/UL
RBC # BLD: 4.48 M/UL
RBC # FLD: 12.8 %
WBC # FLD AUTO: 8.5 K/UL

## 2019-07-24 ENCOUNTER — APPOINTMENT (OUTPATIENT)
Dept: CT IMAGING | Facility: IMAGING CENTER | Age: 74
End: 2019-07-24
Payer: MEDICARE

## 2019-07-24 ENCOUNTER — OUTPATIENT (OUTPATIENT)
Dept: OUTPATIENT SERVICES | Facility: HOSPITAL | Age: 74
LOS: 1 days | End: 2019-07-24
Payer: COMMERCIAL

## 2019-07-24 DIAGNOSIS — C54.1 MALIGNANT NEOPLASM OF ENDOMETRIUM: ICD-10-CM

## 2019-07-24 DIAGNOSIS — Z98.891 HISTORY OF UTERINE SCAR FROM PREVIOUS SURGERY: Chronic | ICD-10-CM

## 2019-07-24 DIAGNOSIS — Z92.89 PERSONAL HISTORY OF OTHER MEDICAL TREATMENT: Chronic | ICD-10-CM

## 2019-07-24 PROCEDURE — 74177 CT ABD & PELVIS W/CONTRAST: CPT | Mod: 26

## 2019-07-24 PROCEDURE — 74177 CT ABD & PELVIS W/CONTRAST: CPT

## 2019-08-09 ENCOUNTER — APPOINTMENT (OUTPATIENT)
Dept: RADIATION ONCOLOGY | Facility: CLINIC | Age: 74
End: 2019-08-09

## 2019-08-09 VITALS
TEMPERATURE: 99.14 F | OXYGEN SATURATION: 97 % | HEIGHT: 62 IN | DIASTOLIC BLOOD PRESSURE: 79 MMHG | BODY MASS INDEX: 29.25 KG/M2 | HEART RATE: 87 BPM | RESPIRATION RATE: 16 BRPM | SYSTOLIC BLOOD PRESSURE: 119 MMHG | WEIGHT: 158.95 LBS

## 2019-08-09 PROCEDURE — 77334 RADIATION TREATMENT AID(S): CPT | Mod: 26

## 2019-08-09 PROCEDURE — 77290 THER RAD SIMULAJ FIELD CPLX: CPT | Mod: 26

## 2019-08-09 PROCEDURE — 77263 THER RADIOLOGY TX PLNG CPLX: CPT

## 2019-08-12 PROCEDURE — 77317 BRACHYTX ISODOSE INTERMED: CPT | Mod: 26

## 2019-08-13 PROCEDURE — 77770 HDR RDNCL NTRSTL/ICAV BRCHTX: CPT | Mod: 26

## 2019-08-13 PROCEDURE — 77332 RADIATION TREATMENT AID(S): CPT | Mod: 26

## 2019-08-13 PROCEDURE — 57156 INS VAG BRACHYTX DEVICE: CPT

## 2019-08-13 NOTE — HISTORY OF PRESENT ILLNESS
[FreeTextEntry1] : Asad Paz is a 74 year old female with newly diagnosed stage IA endometrial cancer seen in office in July 2019 now returns for a follow up prior to vaginal brachytherapy. \par We discussed her recent CT scan.\par As discussed with Dr. [place], we will proceed with cylinder treatment and reassess with CT in approximately 4 weeks.\par

## 2019-08-16 PROCEDURE — 77332 RADIATION TREATMENT AID(S): CPT | Mod: 26

## 2019-08-16 PROCEDURE — 57156 INS VAG BRACHYTX DEVICE: CPT

## 2019-08-16 PROCEDURE — 77770 HDR RDNCL NTRSTL/ICAV BRCHTX: CPT | Mod: 26

## 2019-08-19 ENCOUNTER — APPOINTMENT (OUTPATIENT)
Dept: GYNECOLOGIC ONCOLOGY | Facility: CLINIC | Age: 74
End: 2019-08-19
Payer: MEDICARE

## 2019-08-19 VITALS
SYSTOLIC BLOOD PRESSURE: 122 MMHG | WEIGHT: 154 LBS | HEART RATE: 70 BPM | BODY MASS INDEX: 28.34 KG/M2 | TEMPERATURE: 210.2 F | HEIGHT: 62 IN | DIASTOLIC BLOOD PRESSURE: 64 MMHG

## 2019-08-19 PROCEDURE — 99024 POSTOP FOLLOW-UP VISIT: CPT

## 2019-08-20 PROCEDURE — 57156 INS VAG BRACHYTX DEVICE: CPT

## 2019-08-20 PROCEDURE — 77770 HDR RDNCL NTRSTL/ICAV BRCHTX: CPT | Mod: 26

## 2019-08-20 PROCEDURE — 77332 RADIATION TREATMENT AID(S): CPT | Mod: 26

## 2019-08-23 PROBLEM — C54.1 MALIGNANT NEOPLASM OF ENDOMETRIUM: Status: ACTIVE | Noted: 2019-08-23

## 2019-08-23 NOTE — DISEASE MANAGEMENT
[Pathological] : TNM Stage: p [IA] : IA [TTNM] : 1a [FreeTextEntry4] : endometrial [NTNM] : 0 [MTNM] : 0

## 2019-08-23 NOTE — REVIEW OF SYSTEMS
[Constipation: Grade 0] : Constipation: Grade 0 [Diarrhea: Grade 0] : Diarrhea: Grade 0 [Vaginal Infection: Grade 0] : Vaginal Infection: Grade 0  [Hematuria: Grade 0] : Hematuria: Grade 0 [Fatigue: Grade 1 - Fatigue relieved by rest] : Fatigue: Grade 1 - Fatigue relieved by rest [Urinary Retention: Grade 0] : Urinary Retention: Grade 0 [Urinary Incontinence: Grade 0] : Urinary Incontinence: Grade 0

## 2019-08-23 NOTE — HISTORY OF PRESENT ILLNESS
[FreeTextEntry1] : Mrs. paz is referred for discussion of post hysterectomy adjuvant treatment.\par Her HPI may be summarized as follows;\par Mrs. Paz experienced PMB on4/19/19.  This lasted a few days prompting a visit to Dr. Brady for gynecologic evaluation. An endometrial biopsy was done and revealed FIGO grade 3 endometrial cancer. Apelvic and transvaginal ultrasound on 5/21/2019 revealed 3 uterine calcifications and non visualization of the ovaries. She saw Dr. Gonzalez who recommended surgical management. CT C/A/P done preoperatively were without evidence of metastatic disease.\par Definitive surgery was done on June 11. Pathology confirmed Grade 3 endometrioid carcinoma with 47% myometrial invasion and LVSI.\par Subsequently developed fever and was seen in ER and treated for presumed UTI.

## 2019-08-23 NOTE — PHYSICAL EXAM
[General Appearance - In No Acute Distress] : in no acute distress [Abdomen Tenderness] : non-tender [Exaggerated Use Of Accessory Muscles For Inspiration] : no accessory muscle use [] : no hepato-splenomegaly [Cervical Lymph Nodes Enlarged Anterior Bilaterally] : anterior cervical [Supraclavicular Lymph Nodes Enlarged Bilaterally] : supraclavicular [Normal] : oriented to person, place and time, the affect was normal, the mood was normal and not anxious

## 2019-09-09 ENCOUNTER — OUTPATIENT (OUTPATIENT)
Dept: OUTPATIENT SERVICES | Facility: HOSPITAL | Age: 74
LOS: 1 days | End: 2019-09-09
Payer: COMMERCIAL

## 2019-09-09 ENCOUNTER — APPOINTMENT (OUTPATIENT)
Dept: CT IMAGING | Facility: IMAGING CENTER | Age: 74
End: 2019-09-09
Payer: MEDICARE

## 2019-09-09 DIAGNOSIS — C54.1 MALIGNANT NEOPLASM OF ENDOMETRIUM: ICD-10-CM

## 2019-09-09 DIAGNOSIS — Z98.891 HISTORY OF UTERINE SCAR FROM PREVIOUS SURGERY: Chronic | ICD-10-CM

## 2019-09-09 DIAGNOSIS — Z92.89 PERSONAL HISTORY OF OTHER MEDICAL TREATMENT: Chronic | ICD-10-CM

## 2019-09-09 PROCEDURE — 74177 CT ABD & PELVIS W/CONTRAST: CPT | Mod: 26

## 2019-09-09 PROCEDURE — 74177 CT ABD & PELVIS W/CONTRAST: CPT

## 2019-09-09 PROCEDURE — 82565 ASSAY OF CREATININE: CPT

## 2019-09-26 ENCOUNTER — APPOINTMENT (OUTPATIENT)
Dept: RADIATION ONCOLOGY | Facility: CLINIC | Age: 74
End: 2019-09-26
Payer: MEDICARE

## 2019-09-26 VITALS
RESPIRATION RATE: 16 BRPM | SYSTOLIC BLOOD PRESSURE: 154 MMHG | HEART RATE: 70 BPM | DIASTOLIC BLOOD PRESSURE: 87 MMHG | OXYGEN SATURATION: 97 % | WEIGHT: 148.04 LBS | BODY MASS INDEX: 27.08 KG/M2

## 2019-09-26 PROCEDURE — 99024 POSTOP FOLLOW-UP VISIT: CPT

## 2019-09-26 NOTE — REVIEW OF SYSTEMS
[Constipation: Grade 0] : Constipation: Grade 0 [Diarrhea: Grade 0] : Diarrhea: Grade 0 [Fatigue: Grade 0] : Fatigue: Grade 0 [Urinary Incontinence: Grade 0] : Urinary Incontinence: Grade 0  [Urinary Retention: Grade 0] : Urinary Retention: Grade 0 [Urinary Tract Pain: Grade 0] : Urinary Tract Pain: Grade 0 [Urinary Urgency: Grade 0] : Urinary Urgency: Grade 0 [Urinary Frequency: Grade 0] : Urinary Frequency: Grade 0

## 2019-10-11 NOTE — HISTORY OF PRESENT ILLNESS
[FreeTextEntry1] : Asad Paz is a 74 year old female diagnosed with stage IA endometrial cancer s/p definitive surgery. \par \par She received vaginal brachytherapy to a total dose of 2100 cGy in 3 fractions. Treatment was delivered from 8/13/19 -8/20/19. She tolerated treatment well. She did not develop any grade 3 or higher acute toxicities as a result of the treatment. \par \par She returns today for a post treatment evaluation. She is feeling well. Denies any pain. No urinary or bowel complaints. No vaginal bleeding. Reports scant brownish vaginal discharge. Had CT recently to follow up on ascites and possible carcinomatosis on 9/9/19. Interval decrease in volume of ascites likely explaining decrease in conspicuity of fine peritoneal infiltrate changes, which nonetheless remain suspicious for peritoneal carcinomatosis. Follow up CT in Dec. Continues to follow up with Dr. Gonzalez. Vaginal dilator discussed

## 2019-10-11 NOTE — PHYSICAL EXAM
[General Appearance - In No Acute Distress] : in no acute distress [] : no respiratory distress [Abdomen Soft] : soft [Nondistended] : nondistended [Normal External Genitalia] : normal external genitalia  [Normal Vaginal Cuff] : vaginal cuff without lesion or nodularity

## 2019-12-11 ENCOUNTER — APPOINTMENT (OUTPATIENT)
Dept: CT IMAGING | Facility: IMAGING CENTER | Age: 74
End: 2019-12-11

## 2019-12-12 ENCOUNTER — OUTPATIENT (OUTPATIENT)
Dept: OUTPATIENT SERVICES | Facility: HOSPITAL | Age: 74
LOS: 1 days | End: 2019-12-12
Payer: COMMERCIAL

## 2019-12-12 ENCOUNTER — APPOINTMENT (OUTPATIENT)
Dept: CT IMAGING | Facility: IMAGING CENTER | Age: 74
End: 2019-12-12
Payer: MEDICARE

## 2019-12-12 DIAGNOSIS — Z92.89 PERSONAL HISTORY OF OTHER MEDICAL TREATMENT: Chronic | ICD-10-CM

## 2019-12-12 DIAGNOSIS — C54.1 MALIGNANT NEOPLASM OF ENDOMETRIUM: ICD-10-CM

## 2019-12-12 DIAGNOSIS — Z98.891 HISTORY OF UTERINE SCAR FROM PREVIOUS SURGERY: Chronic | ICD-10-CM

## 2019-12-12 PROCEDURE — 74177 CT ABD & PELVIS W/CONTRAST: CPT

## 2019-12-12 PROCEDURE — 74177 CT ABD & PELVIS W/CONTRAST: CPT | Mod: 26

## 2019-12-12 PROCEDURE — 82565 ASSAY OF CREATININE: CPT

## 2020-02-10 ENCOUNTER — APPOINTMENT (OUTPATIENT)
Dept: GYNECOLOGIC ONCOLOGY | Facility: CLINIC | Age: 75
End: 2020-02-10
Payer: MEDICARE

## 2020-02-10 VITALS
SYSTOLIC BLOOD PRESSURE: 150 MMHG | HEIGHT: 62 IN | BODY MASS INDEX: 27.79 KG/M2 | HEART RATE: 77 BPM | DIASTOLIC BLOOD PRESSURE: 76 MMHG | WEIGHT: 151 LBS

## 2020-02-10 PROCEDURE — 99214 OFFICE O/P EST MOD 30 MIN: CPT

## 2020-04-02 ENCOUNTER — APPOINTMENT (OUTPATIENT)
Dept: RADIATION ONCOLOGY | Facility: CLINIC | Age: 75
End: 2020-04-02

## 2020-08-03 ENCOUNTER — APPOINTMENT (OUTPATIENT)
Dept: GYNECOLOGIC ONCOLOGY | Facility: CLINIC | Age: 75
End: 2020-08-03
Payer: MEDICARE

## 2020-08-03 VITALS
WEIGHT: 149 LBS | HEART RATE: 82 BPM | SYSTOLIC BLOOD PRESSURE: 123 MMHG | HEIGHT: 62 IN | DIASTOLIC BLOOD PRESSURE: 73 MMHG | BODY MASS INDEX: 27.42 KG/M2

## 2020-08-03 DIAGNOSIS — C54.1 MALIGNANT NEOPLASM OF ENDOMETRIUM: ICD-10-CM

## 2020-08-03 DIAGNOSIS — I82.409 ACUTE EMBOLISM AND THROMBOSIS OF UNSPECIFIED DEEP VEINS OF UNSPECIFIED LOWER EXTREMITY: ICD-10-CM

## 2020-08-03 PROCEDURE — 99214 OFFICE O/P EST MOD 30 MIN: CPT

## 2020-08-17 ENCOUNTER — APPOINTMENT (OUTPATIENT)
Dept: GYNECOLOGIC ONCOLOGY | Facility: CLINIC | Age: 75
End: 2020-08-17

## 2020-10-14 DIAGNOSIS — M84.40XA PATHOLOGICAL FRACTURE, UNSPECIFIED SITE, INITIAL ENCOUNTER FOR FRACTURE: ICD-10-CM

## 2020-10-30 ENCOUNTER — NON-APPOINTMENT (OUTPATIENT)
Age: 75
End: 2020-10-30

## 2020-11-03 ENCOUNTER — NON-APPOINTMENT (OUTPATIENT)
Age: 75
End: 2020-11-03

## 2020-11-13 ENCOUNTER — APPOINTMENT (OUTPATIENT)
Dept: NUCLEAR MEDICINE | Facility: IMAGING CENTER | Age: 75
End: 2020-11-13

## 2020-11-19 ENCOUNTER — NON-APPOINTMENT (OUTPATIENT)
Age: 75
End: 2020-11-19

## 2020-12-01 ENCOUNTER — APPOINTMENT (OUTPATIENT)
Dept: INTERVENTIONAL RADIOLOGY/VASCULAR | Facility: CLINIC | Age: 75
End: 2020-12-01
Payer: MEDICARE

## 2020-12-01 VITALS — BODY MASS INDEX: 26.68 KG/M2 | WEIGHT: 145 LBS | HEIGHT: 62 IN

## 2020-12-01 DIAGNOSIS — R91.8 OTHER NONSPECIFIC ABNORMAL FINDING OF LUNG FIELD: ICD-10-CM

## 2020-12-01 PROCEDURE — 99443: CPT

## 2020-12-01 RX ORDER — APIXABAN 5 MG/1
5 TABLET, FILM COATED ORAL
Refills: 0 | Status: ACTIVE | COMMUNITY

## 2020-12-07 ENCOUNTER — NON-APPOINTMENT (OUTPATIENT)
Age: 75
End: 2020-12-07

## 2020-12-10 ENCOUNTER — APPOINTMENT (OUTPATIENT)
Dept: NUCLEAR MEDICINE | Facility: IMAGING CENTER | Age: 75
End: 2020-12-10

## 2020-12-15 ENCOUNTER — INPATIENT (INPATIENT)
Facility: HOSPITAL | Age: 75
LOS: 2 days | Discharge: HOME | DRG: 871 | End: 2020-12-18
Attending: INTERNAL MEDICINE | Admitting: INTERNAL MEDICINE
Payer: COMMERCIAL

## 2020-12-15 VITALS
RESPIRATION RATE: 22 BRPM | HEART RATE: 114 BPM | SYSTOLIC BLOOD PRESSURE: 111 MMHG | DIASTOLIC BLOOD PRESSURE: 73 MMHG | HEIGHT: 61 IN | WEIGHT: 143.96 LBS | OXYGEN SATURATION: 99 %

## 2020-12-15 DIAGNOSIS — Z92.89 PERSONAL HISTORY OF OTHER MEDICAL TREATMENT: Chronic | ICD-10-CM

## 2020-12-15 DIAGNOSIS — R06.03 ACUTE RESPIRATORY DISTRESS: ICD-10-CM

## 2020-12-15 DIAGNOSIS — Z98.891 HISTORY OF UTERINE SCAR FROM PREVIOUS SURGERY: Chronic | ICD-10-CM

## 2020-12-15 DIAGNOSIS — Z90.710 ACQUIRED ABSENCE OF BOTH CERVIX AND UTERUS: Chronic | ICD-10-CM

## 2020-12-15 DIAGNOSIS — R09.89 OTHER SPECIFIED SYMPTOMS AND SIGNS INVOLVING THE CIRCULATORY AND RESPIRATORY SYSTEMS: ICD-10-CM

## 2020-12-15 LAB
ACETONE SERPL-MCNC: ABNORMAL
ALBUMIN SERPL ELPH-MCNC: 2.7 G/DL — LOW (ref 3.5–5)
ALP SERPL-CCNC: 104 U/L — SIGNIFICANT CHANGE UP (ref 40–120)
ALT FLD-CCNC: 20 U/L DA — SIGNIFICANT CHANGE UP (ref 10–60)
ANION GAP SERPL CALC-SCNC: 10 MMOL/L — SIGNIFICANT CHANGE UP (ref 5–17)
ANION GAP SERPL CALC-SCNC: 5 MMOL/L — SIGNIFICANT CHANGE UP (ref 5–17)
APPEARANCE UR: CLEAR — SIGNIFICANT CHANGE UP
APTT BLD: 26.1 SEC — LOW (ref 27.5–35.5)
AST SERPL-CCNC: 36 U/L — SIGNIFICANT CHANGE UP (ref 10–40)
BACTERIA # UR AUTO: ABNORMAL /HPF
BASE EXCESS BLDA CALC-SCNC: -0.4 MMOL/L — SIGNIFICANT CHANGE UP (ref -2–2)
BASE EXCESS BLDA CALC-SCNC: -2.5 MMOL/L — LOW (ref -2–2)
BASE EXCESS BLDA CALC-SCNC: 2.2 MMOL/L — HIGH (ref -2–2)
BASE EXCESS BLDV CALC-SCNC: -3.5 MMOL/L — LOW (ref -2–2)
BASOPHILS # BLD AUTO: 0 K/UL — SIGNIFICANT CHANGE UP (ref 0–0.2)
BASOPHILS NFR BLD AUTO: 0 % — SIGNIFICANT CHANGE UP (ref 0–2)
BILIRUB SERPL-MCNC: 0.3 MG/DL — SIGNIFICANT CHANGE UP (ref 0.2–1.2)
BILIRUB UR-MCNC: NEGATIVE — SIGNIFICANT CHANGE UP
BLOOD GAS COMMENTS ARTERIAL: SIGNIFICANT CHANGE UP
BUN SERPL-MCNC: 42 MG/DL — HIGH (ref 7–18)
BUN SERPL-MCNC: 43 MG/DL — HIGH (ref 7–18)
CALCIUM SERPL-MCNC: 10.4 MG/DL — SIGNIFICANT CHANGE UP (ref 8.4–10.5)
CALCIUM SERPL-MCNC: 10.5 MG/DL — SIGNIFICANT CHANGE UP (ref 8.4–10.5)
CHLORIDE SERPL-SCNC: 92 MMOL/L — LOW (ref 96–108)
CHLORIDE SERPL-SCNC: 96 MMOL/L — SIGNIFICANT CHANGE UP (ref 96–108)
CK SERPL-CCNC: 44 U/L — SIGNIFICANT CHANGE UP (ref 21–215)
CO2 SERPL-SCNC: 26 MMOL/L — SIGNIFICANT CHANGE UP (ref 22–31)
CO2 SERPL-SCNC: 32 MMOL/L — HIGH (ref 22–31)
COLOR SPEC: YELLOW — SIGNIFICANT CHANGE UP
CREAT SERPL-MCNC: 0.58 MG/DL — SIGNIFICANT CHANGE UP (ref 0.5–1.3)
CREAT SERPL-MCNC: 0.74 MG/DL — SIGNIFICANT CHANGE UP (ref 0.5–1.3)
DIFF PNL FLD: ABNORMAL
EOSINOPHIL # BLD AUTO: 0 K/UL — SIGNIFICANT CHANGE UP (ref 0–0.5)
EOSINOPHIL NFR BLD AUTO: 0 % — SIGNIFICANT CHANGE UP (ref 0–6)
EPI CELLS # UR: SIGNIFICANT CHANGE UP /HPF
GLUCOSE SERPL-MCNC: 138 MG/DL — HIGH (ref 70–99)
GLUCOSE SERPL-MCNC: 157 MG/DL — HIGH (ref 70–99)
GLUCOSE UR QL: NEGATIVE — SIGNIFICANT CHANGE UP
HCO3 BLDA-SCNC: 24 MMOL/L — SIGNIFICANT CHANGE UP (ref 23–27)
HCO3 BLDA-SCNC: 31 MMOL/L — HIGH (ref 23–27)
HCO3 BLDA-SCNC: 34 MMOL/L — HIGH (ref 23–27)
HCO3 BLDV-SCNC: 24 MMOL/L — SIGNIFICANT CHANGE UP (ref 21–29)
HCT VFR BLD CALC: 36.8 % — SIGNIFICANT CHANGE UP (ref 34.5–45)
HGB BLD-MCNC: 11 G/DL — LOW (ref 11.5–15.5)
HOROWITZ INDEX BLDA+IHG-RTO: 100 — SIGNIFICANT CHANGE UP
HOROWITZ INDEX BLDV+IHG-RTO: 70 — SIGNIFICANT CHANGE UP
HYALINE CASTS # UR AUTO: ABNORMAL /LPF
INR BLD: 2.02 RATIO — HIGH (ref 0.88–1.16)
KETONES UR-MCNC: ABNORMAL
LACTATE SERPL-SCNC: 2.2 MMOL/L — HIGH (ref 0.7–2)
LACTATE SERPL-SCNC: 3.8 MMOL/L — HIGH (ref 0.7–2)
LEUKOCYTE ESTERASE UR-ACNC: ABNORMAL
LYMPHOCYTES # BLD AUTO: 0.09 K/UL — LOW (ref 1–3.3)
LYMPHOCYTES # BLD AUTO: 1 % — LOW (ref 13–44)
MAGNESIUM SERPL-MCNC: 2.5 MG/DL — SIGNIFICANT CHANGE UP (ref 1.6–2.6)
MANUAL SMEAR VERIFICATION: SIGNIFICANT CHANGE UP
MCHC RBC-ENTMCNC: 28.7 PG — SIGNIFICANT CHANGE UP (ref 27–34)
MCHC RBC-ENTMCNC: 29.9 GM/DL — LOW (ref 32–36)
MCV RBC AUTO: 96.1 FL — SIGNIFICANT CHANGE UP (ref 80–100)
MONOCYTES # BLD AUTO: 0.57 K/UL — SIGNIFICANT CHANGE UP (ref 0–0.9)
MONOCYTES NFR BLD AUTO: 6 % — SIGNIFICANT CHANGE UP (ref 2–14)
NEUTROPHILS # BLD AUTO: 8.63 K/UL — HIGH (ref 1.8–7.4)
NEUTROPHILS NFR BLD AUTO: 91 % — HIGH (ref 43–77)
NITRITE UR-MCNC: NEGATIVE — SIGNIFICANT CHANGE UP
NRBC # BLD: 0 /100 — SIGNIFICANT CHANGE UP (ref 0–0)
NT-PROBNP SERPL-SCNC: 2207 PG/ML — HIGH (ref 0–450)
PCO2 BLDA: 103 MMHG — CRITICAL HIGH (ref 32–46)
PCO2 BLDA: 51 MMHG — HIGH (ref 32–46)
PCO2 BLDA: 97 MMHG — CRITICAL HIGH (ref 32–46)
PCO2 BLDV: 58 MMHG — HIGH (ref 35–50)
PH BLDA: 7.11 — CRITICAL LOW (ref 7.35–7.45)
PH BLDA: 7.16 — CRITICAL LOW (ref 7.35–7.45)
PH BLDA: 7.29 — LOW (ref 7.35–7.45)
PH BLDV: 7.23 — LOW (ref 7.35–7.45)
PH UR: 5 — SIGNIFICANT CHANGE UP (ref 5–8)
PLAT MORPH BLD: NORMAL — SIGNIFICANT CHANGE UP
PLATELET # BLD AUTO: 282 K/UL — SIGNIFICANT CHANGE UP (ref 150–400)
PO2 BLDA: 283 MMHG — HIGH (ref 74–108)
PO2 BLDA: 65 MMHG — LOW (ref 74–108)
PO2 BLDA: 96 MMHG — SIGNIFICANT CHANGE UP (ref 74–108)
PO2 BLDV: 34 MMHG — SIGNIFICANT CHANGE UP (ref 25–45)
POTASSIUM SERPL-MCNC: 5.8 MMOL/L — HIGH (ref 3.5–5.3)
POTASSIUM SERPL-MCNC: 6.1 MMOL/L — HIGH (ref 3.5–5.3)
POTASSIUM SERPL-SCNC: 5.8 MMOL/L — HIGH (ref 3.5–5.3)
POTASSIUM SERPL-SCNC: 6.1 MMOL/L — HIGH (ref 3.5–5.3)
PROT SERPL-MCNC: 8.7 G/DL — HIGH (ref 6–8.3)
PROT UR-MCNC: 30 MG/DL
PROTHROM AB SERPL-ACNC: 23.3 SEC — HIGH (ref 10.6–13.6)
RAPID RVP RESULT: SIGNIFICANT CHANGE UP
RBC # BLD: 3.83 M/UL — SIGNIFICANT CHANGE UP (ref 3.8–5.2)
RBC # FLD: 13.3 % — SIGNIFICANT CHANGE UP (ref 10.3–14.5)
RBC BLD AUTO: NORMAL — SIGNIFICANT CHANGE UP
RBC CASTS # UR COMP ASSIST: ABNORMAL /HPF (ref 0–2)
SAO2 % BLDA: 100 % — HIGH (ref 92–96)
SAO2 % BLDA: 86 % — LOW (ref 92–96)
SAO2 % BLDA: 95 % — SIGNIFICANT CHANGE UP (ref 92–96)
SAO2 % BLDV: 55 % — LOW (ref 67–88)
SARS-COV-2 RNA SPEC QL NAA+PROBE: SIGNIFICANT CHANGE UP
SODIUM SERPL-SCNC: 129 MMOL/L — LOW (ref 135–145)
SODIUM SERPL-SCNC: 132 MMOL/L — LOW (ref 135–145)
SP GR SPEC: 1.02 — SIGNIFICANT CHANGE UP (ref 1.01–1.02)
TROPONIN I SERPL-MCNC: <0.015 NG/ML — SIGNIFICANT CHANGE UP (ref 0–0.04)
UROBILINOGEN FLD QL: 1
VARIANT LYMPHS # BLD: 2 % — SIGNIFICANT CHANGE UP (ref 0–6)
WBC # BLD: 9.48 K/UL — SIGNIFICANT CHANGE UP (ref 3.8–10.5)
WBC # FLD AUTO: 9.48 K/UL — SIGNIFICANT CHANGE UP (ref 3.8–10.5)
WBC UR QL: ABNORMAL /HPF (ref 0–5)

## 2020-12-15 PROCEDURE — 71275 CT ANGIOGRAPHY CHEST: CPT | Mod: 26

## 2020-12-15 PROCEDURE — 71045 X-RAY EXAM CHEST 1 VIEW: CPT | Mod: 26

## 2020-12-15 PROCEDURE — 70450 CT HEAD/BRAIN W/O DYE: CPT | Mod: 26

## 2020-12-15 PROCEDURE — 71045 X-RAY EXAM CHEST 1 VIEW: CPT | Mod: 26,77,76

## 2020-12-15 PROCEDURE — 31500 INSERT EMERGENCY AIRWAY: CPT

## 2020-12-15 PROCEDURE — 99291 CRITICAL CARE FIRST HOUR: CPT | Mod: 25

## 2020-12-15 RX ORDER — PIPERACILLIN AND TAZOBACTAM 4; .5 G/20ML; G/20ML
3.38 INJECTION, POWDER, LYOPHILIZED, FOR SOLUTION INTRAVENOUS ONCE
Refills: 0 | Status: COMPLETED | OUTPATIENT
Start: 2020-12-15 | End: 2020-12-15

## 2020-12-15 RX ORDER — MIDAZOLAM HYDROCHLORIDE 1 MG/ML
4 INJECTION, SOLUTION INTRAMUSCULAR; INTRAVENOUS ONCE
Refills: 0 | Status: DISCONTINUED | OUTPATIENT
Start: 2020-12-15 | End: 2020-12-15

## 2020-12-15 RX ORDER — DEXTROSE 50 % IN WATER 50 %
100 SYRINGE (ML) INTRAVENOUS ONCE
Refills: 0 | Status: DISCONTINUED | OUTPATIENT
Start: 2020-12-15 | End: 2020-12-15

## 2020-12-15 RX ORDER — SODIUM CHLORIDE 9 MG/ML
1000 INJECTION, SOLUTION INTRAVENOUS ONCE
Refills: 0 | Status: COMPLETED | OUTPATIENT
Start: 2020-12-15 | End: 2020-12-15

## 2020-12-15 RX ORDER — SODIUM CHLORIDE 9 MG/ML
1000 INJECTION INTRAMUSCULAR; INTRAVENOUS; SUBCUTANEOUS
Refills: 0 | Status: DISCONTINUED | OUTPATIENT
Start: 2020-12-15 | End: 2020-12-16

## 2020-12-15 RX ORDER — MIDAZOLAM HYDROCHLORIDE 1 MG/ML
2 INJECTION, SOLUTION INTRAMUSCULAR; INTRAVENOUS ONCE
Refills: 0 | Status: DISCONTINUED | OUTPATIENT
Start: 2020-12-15 | End: 2020-12-15

## 2020-12-15 RX ORDER — PHENYLEPHRINE HYDROCHLORIDE 10 MG/ML
0.3 INJECTION INTRAVENOUS
Qty: 40 | Refills: 0 | Status: DISCONTINUED | OUTPATIENT
Start: 2020-12-15 | End: 2020-12-16

## 2020-12-15 RX ORDER — ENOXAPARIN SODIUM 100 MG/ML
80 INJECTION SUBCUTANEOUS
Refills: 0 | Status: DISCONTINUED | OUTPATIENT
Start: 2020-12-15 | End: 2020-12-15

## 2020-12-15 RX ORDER — INSULIN HUMAN 100 [IU]/ML
10 INJECTION, SOLUTION SUBCUTANEOUS ONCE
Refills: 0 | Status: COMPLETED | OUTPATIENT
Start: 2020-12-15 | End: 2020-12-15

## 2020-12-15 RX ORDER — ENOXAPARIN SODIUM 100 MG/ML
90 INJECTION SUBCUTANEOUS
Refills: 0 | Status: DISCONTINUED | OUTPATIENT
Start: 2020-12-15 | End: 2020-12-16

## 2020-12-15 RX ORDER — FENTANYL CITRATE 50 UG/ML
0.5 INJECTION INTRAVENOUS
Qty: 5000 | Refills: 0 | Status: DISCONTINUED | OUTPATIENT
Start: 2020-12-15 | End: 2020-12-15

## 2020-12-15 RX ORDER — SODIUM CHLORIDE 9 MG/ML
2000 INJECTION INTRAMUSCULAR; INTRAVENOUS; SUBCUTANEOUS ONCE
Refills: 0 | Status: COMPLETED | OUTPATIENT
Start: 2020-12-15 | End: 2020-12-15

## 2020-12-15 RX ORDER — CHLORHEXIDINE GLUCONATE 213 G/1000ML
1 SOLUTION TOPICAL
Refills: 0 | Status: DISCONTINUED | OUTPATIENT
Start: 2020-12-15 | End: 2020-12-18

## 2020-12-15 RX ORDER — CHLORHEXIDINE GLUCONATE 213 G/1000ML
1 SOLUTION TOPICAL
Refills: 0 | Status: DISCONTINUED | OUTPATIENT
Start: 2020-12-15 | End: 2020-12-16

## 2020-12-15 RX ORDER — INSULIN LISPRO 100/ML
VIAL (ML) SUBCUTANEOUS EVERY 6 HOURS
Refills: 0 | Status: DISCONTINUED | OUTPATIENT
Start: 2020-12-15 | End: 2020-12-18

## 2020-12-15 RX ORDER — PANTOPRAZOLE SODIUM 20 MG/1
40 TABLET, DELAYED RELEASE ORAL DAILY
Refills: 0 | Status: DISCONTINUED | OUTPATIENT
Start: 2020-12-15 | End: 2020-12-16

## 2020-12-15 RX ORDER — MIDAZOLAM HYDROCHLORIDE 1 MG/ML
0.02 INJECTION, SOLUTION INTRAMUSCULAR; INTRAVENOUS
Qty: 100 | Refills: 0 | Status: DISCONTINUED | OUTPATIENT
Start: 2020-12-15 | End: 2020-12-15

## 2020-12-15 RX ORDER — SODIUM CHLORIDE 9 MG/ML
10 INJECTION INTRAMUSCULAR; INTRAVENOUS; SUBCUTANEOUS
Refills: 0 | Status: DISCONTINUED | OUTPATIENT
Start: 2020-12-15 | End: 2020-12-18

## 2020-12-15 RX ORDER — CEFEPIME 1 G/1
2000 INJECTION, POWDER, FOR SOLUTION INTRAMUSCULAR; INTRAVENOUS EVERY 8 HOURS
Refills: 0 | Status: DISCONTINUED | OUTPATIENT
Start: 2020-12-15 | End: 2020-12-18

## 2020-12-15 RX ORDER — DEXTROSE 50 % IN WATER 50 %
50 SYRINGE (ML) INTRAVENOUS ONCE
Refills: 0 | Status: COMPLETED | OUTPATIENT
Start: 2020-12-15 | End: 2020-12-15

## 2020-12-15 RX ORDER — PANTOPRAZOLE SODIUM 20 MG/1
40 TABLET, DELAYED RELEASE ORAL ONCE
Refills: 0 | Status: COMPLETED | OUTPATIENT
Start: 2020-12-15 | End: 2020-12-15

## 2020-12-15 RX ORDER — CHLORHEXIDINE GLUCONATE 213 G/1000ML
15 SOLUTION TOPICAL EVERY 12 HOURS
Refills: 0 | Status: DISCONTINUED | OUTPATIENT
Start: 2020-12-15 | End: 2020-12-18

## 2020-12-15 RX ORDER — FENTANYL CITRATE 50 UG/ML
0.5 INJECTION INTRAVENOUS
Qty: 2500 | Refills: 0 | Status: DISCONTINUED | OUTPATIENT
Start: 2020-12-15 | End: 2020-12-15

## 2020-12-15 RX ADMIN — PHENYLEPHRINE HYDROCHLORIDE 7.35 MICROGRAM(S)/KG/MIN: 10 INJECTION INTRAVENOUS at 23:34

## 2020-12-15 RX ADMIN — FENTANYL CITRATE 3.27 MICROGRAM(S)/KG/HR: 50 INJECTION INTRAVENOUS at 19:54

## 2020-12-15 RX ADMIN — MIDAZOLAM HYDROCHLORIDE 4 MILLIGRAM(S): 1 INJECTION, SOLUTION INTRAMUSCULAR; INTRAVENOUS at 18:10

## 2020-12-15 RX ADMIN — CEFEPIME 100 MILLIGRAM(S): 1 INJECTION, POWDER, FOR SOLUTION INTRAMUSCULAR; INTRAVENOUS at 23:10

## 2020-12-15 RX ADMIN — MIDAZOLAM HYDROCHLORIDE 2 MILLIGRAM(S): 1 INJECTION, SOLUTION INTRAMUSCULAR; INTRAVENOUS at 20:38

## 2020-12-15 RX ADMIN — MIDAZOLAM HYDROCHLORIDE 2 MILLIGRAM(S): 1 INJECTION, SOLUTION INTRAMUSCULAR; INTRAVENOUS at 19:22

## 2020-12-15 RX ADMIN — PANTOPRAZOLE SODIUM 40 MILLIGRAM(S): 20 TABLET, DELAYED RELEASE ORAL at 18:13

## 2020-12-15 RX ADMIN — SODIUM CHLORIDE 2000 MILLILITER(S): 9 INJECTION INTRAMUSCULAR; INTRAVENOUS; SUBCUTANEOUS at 20:55

## 2020-12-15 RX ADMIN — Medication 50 MILLILITER(S): at 20:25

## 2020-12-15 RX ADMIN — SODIUM CHLORIDE 125 MILLILITER(S): 9 INJECTION INTRAMUSCULAR; INTRAVENOUS; SUBCUTANEOUS at 15:42

## 2020-12-15 RX ADMIN — SODIUM CHLORIDE 1000 MILLILITER(S): 9 INJECTION, SOLUTION INTRAVENOUS at 20:45

## 2020-12-15 RX ADMIN — SODIUM CHLORIDE 125 MILLILITER(S): 9 INJECTION INTRAMUSCULAR; INTRAVENOUS; SUBCUTANEOUS at 18:14

## 2020-12-15 RX ADMIN — INSULIN HUMAN 10 UNIT(S): 100 INJECTION, SOLUTION SUBCUTANEOUS at 20:20

## 2020-12-15 RX ADMIN — PHENYLEPHRINE HYDROCHLORIDE 7.35 MICROGRAM(S)/KG/MIN: 10 INJECTION INTRAVENOUS at 20:49

## 2020-12-15 RX ADMIN — PIPERACILLIN AND TAZOBACTAM 200 GRAM(S): 4; .5 INJECTION, POWDER, LYOPHILIZED, FOR SOLUTION INTRAVENOUS at 18:13

## 2020-12-15 RX ADMIN — SODIUM CHLORIDE 4000 MILLILITER(S): 9 INJECTION INTRAMUSCULAR; INTRAVENOUS; SUBCUTANEOUS at 18:14

## 2020-12-15 RX ADMIN — MIDAZOLAM HYDROCHLORIDE 2 MILLIGRAM(S): 1 INJECTION, SOLUTION INTRAMUSCULAR; INTRAVENOUS at 21:45

## 2020-12-15 NOTE — ED PROVIDER NOTE - OBJECTIVE STATEMENT
76 y/o F pt with a PMHx of DM and Uterine cancer and a PSHx of PA-BSO presents to ED for AMS today. Hx obtained from daughter who reports pt was rehabbing for a broken coccyx bone at [facility] and was sent home this past Friday. Daughter reports that at the time, pt O2 was 93% and she is on 5L of home O2. Daughter reports that pt has slowly stopped moving around as much, has stopped talking as much and has had no appetite. Daughter also notes a cough productive of phlegm and that the pt sounds congested. Daughter reports that her cousin noticed pt not to be using O2 last night. Daughter reports this morning at 07:30 pt appeared to be drowsy and lethargic and then at 08:30 when she tried to give pt something eat she appeared to be confused and incoherent. Daughter reports pt has been taking Oxycodone Q8H for the pain. Daughter denies vomiting, or any other acute complaints. NKDA.

## 2020-12-15 NOTE — ED PROVIDER NOTE - CLINICAL SUMMARY MEDICAL DECISION MAKING FREE TEXT BOX
pt with resp. distress, AMS changes, diminished BS over Lt sided, concern for infection, CO2 retention, will get labs, CXR, give meds. admission

## 2020-12-15 NOTE — ED PROVIDER NOTE - CONSTITUTIONAL, MLM
In order to meet Medicare requirements, the clinical documentation must support the information cited in the admission order.  Please be sure to provide detailed and clear documentation about the following in the admitting note/history and physical: - - -

## 2020-12-15 NOTE — PROCEDURE NOTE - NSPROCDETAILS_GEN_ALL_CORE
guidewire recovered/lumen(s) aspirated and flushed/sterile dressing applied/sterile technique, catheter placed/ultrasound guidance with use of sterile gel and probe cove

## 2020-12-15 NOTE — ED ADULT TRIAGE NOTE - CHIEF COMPLAINT QUOTE
Increasing Altered mental status after discharge from rehab, Qns ext care x last Friday. Pt had fracture to tail bone. Also previous h/o Fx left Hip/leg. As per daughter, pt is not eating or moving like before and speech is incoherent.

## 2020-12-15 NOTE — H&P ADULT - HISTORY OF PRESENT ILLNESS
74yo female with DM, DVT, Uterine cancer (s/p PA, RTx5, in remission since 2016), anxiety, presented to ER for AMS. Patient in ER was noted to be in respiratory distress with hypercapnia on labs for which she was intubated. As per daughter, patient was diagnosed with DVT in July/August for which she has been on Eliquis. She later had a fracture of femur in same limb (?pathological fracture), requiring placement of mer. Patient underwent biopsy of bone at that time which was negative for malignancy however remained high suspicion. She had increasing pain, and was diagnosed with fracture of coccyx requiring cement repair following which she was transferred to rehab where she progressively deteriorated. Moreover her o2 sats were noted dropping. She was discharged from rehab on 5l NC about 4 days prior . As per daughter cause was not known. She reports having the patient's scans rechecked with PCP who noted pulmonary nodules and recommended lung biopsy. Patient was discharged to home on friday. She has been complaining of difficulty breathing with a noted cough productive of phlegm. Her breathing was getting labored with increasing disorientation. Patient last night kept removing her NC, found this AM to be lethargic with her o2 off, was disoriented and incoherent hence sent to ER. No reported episodes of fever.

## 2020-12-15 NOTE — ED PROVIDER NOTE - CARE PLAN
Principal Discharge DX:	Acute respiratory distress  Secondary Diagnosis:	Pleural effusion  Secondary Diagnosis:	PNA (pneumonia)  Secondary Diagnosis:	Dehydration   Principal Discharge DX:	Acute respiratory distress  Secondary Diagnosis:	Pleural effusion  Secondary Diagnosis:	PNA (pneumonia)  Secondary Diagnosis:	Dehydration  Secondary Diagnosis:	Hypotension

## 2020-12-15 NOTE — H&P ADULT - NSHPPHYSICALEXAM_GEN_ALL_CORE
ICU Vital Signs Last 24 Hrs  T(C): 36.2 (15 Dec 2020 15:13), Max: 36.2 (15 Dec 2020 15:13)  T(F): 97.2 (15 Dec 2020 15:13), Max: 97.2 (15 Dec 2020 15:13)  HR: 101 (15 Dec 2020 20:19) (99 - 114)  BP: 129/65 (15 Dec 2020 20:19) (87/40 - 129/65)  RR: 19 (15 Dec 2020 20:19) (14 - 22)  SpO2: 100% (15 Dec 2020 20:19) (95% - 100%)    PHYSICAL EXAM:  GENERAL: well-developed female, orally intubated, opens eyes  HEAD:  Atraumatic, Normocephalic  EYES: EOMI, PERRLA, conjunctiva and sclera clear  NECK: Supple, No JVD  CHEST/LUNG: BLAE+, congested  HEART: Regular rate and rhythm; No murmurs, rubs, or gallops  ABDOMEN: Soft, Nontender, Nondistended; Bowel sounds present  EXTREMITIES:, No clubbing, cyanosis, or edema  NEUROLOGY: cannot be assessed  SKIN: No rashes or lesions

## 2020-12-15 NOTE — ED ADULT NURSE REASSESSMENT NOTE - NS ED NURSE REASSESS COMMENT FT1
pt sedated with fentanyl drip as ordered, ETT in paced, on cont cardiac monitor, ICU resident at bedside evaluated pt. , phenylephrine drip started to raise BP. WIll cont. care .
Patient intubated with ETT 7, lip line 23 safety maintained.

## 2020-12-15 NOTE — ED ADULT NURSE NOTE - ED STAT RN HANDOFF DETAILS 2
Versed drip from pharmacy  given to LAURA Guzman and LAURA uHrst hold drip for now, cont fentanyl and phenylphrine drip as ordered per IV pump, VS with in normal prior to transport to ICU. Pt sedated well on the way to CT for chest angio and head CT then to ICU, ETT in placed.

## 2020-12-15 NOTE — H&P ADULT - ASSESSMENT
ASSESSMENT AND PLAN:      =================== Neuro============================  Alert and oriented x         ================= Cardiovascular==========================    ================- Pulm=================================    ==================ID===================================      ================= Nephro================================    =================GI====================================    ================ Heme==================================  No issues.    =================Endocrine===============================    ================= Skin/Catheters============================  No rashes. Peripheral IV lines.     - =================Prophylaxis =============================  heparin for DVT proph  Protonix for  GI proph    ==================GOC==================================  ASSESSMENT AND PLAN:  76yo female with DM, DVT, Uterine cancer (s/p PA, RTx5, in remission since 2016), anxiety, presented to ER for AMS characterized by disorientation and shortness of breath. Noted to be in respiratory distress in ER with hypercapnea on ABG as well as CXR with complete whiteout of left lung s/o large pleural effusion hence emergently intubated and ICU consulted for further care.     1. Encephalopathy  2. Acute hypoxic hypercapnic respiratory failure  3. Lactic acidosis  4. Anemia  5. Hyperkalemia    =================== Neuro============================  Alert and oriented x         ================= Cardiovascular==========================    ================- Pulm=================================    ==================ID===================================      ================= Nephro================================    =================GI====================================    ================ Heme==================================  No issues.    =================Endocrine===============================    ================= Skin/Catheters============================  No rashes. Peripheral IV lines.     - =================Prophylaxis =============================  heparin for DVT proph  Protonix for  GI proph    ==================GOC==================================  ASSESSMENT AND PLAN:  76yo female with DM, DVT, Uterine cancer (s/p PA, RTx5, in remission since 2016), anxiety, presented to ER for AMS characterized by disorientation and shortness of breath. Noted to be in respiratory distress in ER with hypercapnea on ABG as well as CXR with complete whiteout of left lung s/o large pleural effusion hence emergently intubated and ICU consulted for further care.     1. Encephalopathy  2. Acute hypoxic hypercapnic respiratory failure  3. Pneumonia  3. Lactic acidosis  4. Anemia  5. Hyperkalemia    =================== Neuro============================  Encephalopathy:  -presented with disorientation, lethargy  -likely toxic metabolic in setting of sepsis from pneumonia as well as co2 retention  -now sedated and intubated  -f/u ct head    ================= Cardiovascular==========================  Shock:  -blood pressure noted to drop s/p intubation requiring pressor support  -c/w pressors via RIJ line, maintain MAP>65      ================- Pulm=================================    ==================ID===================================      ================= Nephro================================    =================GI====================================    ================ Heme==================================  No issues.    =================Endocrine===============================    ================= Skin/Catheters============================  No rashes. Peripheral IV lines.     - =================Prophylaxis =============================  heparin for DVT proph  Protonix for  GI proph    ==================GOC==================================  ASSESSMENT AND PLAN:  74yo female with DM, DVT, Uterine cancer (s/p PA, RTx5, in remission since 2016), anxiety, presented to ER for AMS characterized by disorientation and shortness of breath. Noted to be in respiratory distress in ER with hypercapnea on ABG as well as CXR with complete whiteout of left lung s/o large pleural effusion hence emergently intubated and ICU consulted for further care.     1. Encephalopathy  2. Acute hypoxic hypercapnic respiratory failure  3. Pneumonia  3. Lactic acidosis  4. Anemia  5. Hyperkalemia    =================== Neuro============================  Encephalopathy:  -presented with disorientation, lethargy  -likely toxic metabolic in setting of sepsis from pneumonia as well as co2 retention  -now sedated and intubated  -f/u ct head    ================= Cardiovascular==========================  Shock:  -blood pressure noted to drop s/p intubation requiring pressor support  -shock likely in setting of sepsis  -c/w pressors via RIJ line, maintain MAP>65    ================- Pulm=================================  Acute hypoxic hypercapnic respiratory failure:  -presented with respiratory distress, abg with 7.11|103|96|31  -emergently intubated in ER   -CXR noted with opacification of left hemothorax s/o pleural effusion, pulm infiltrate or a different pathology  -will start on cefepime empirically to cover for pneumonia  -mick since pt with hx of pulmonary nodules, concern for malignancy   -given hx of dvt with possible missed doses of eliquis, will get cta to r/o concomitant PE  -post intubation cxr noted, will pull ETT out by 2cm, fio2 decreased to 70% as per abg  -c/w mechanical ventilation, daily abg/cxr    ==================ID===================================  Pneumonia:  -plan as above    ================= Nephro================================  Hyperkalemia:  -p/w potassium of 6.1m 5.8 on repeat bmp  -s/p d50+insulin,  -f/u repeat bmp    Lactic acidosis:  -lactate noted 3.8 in setting of hypoxia and  shock  -continue with pressor support  -f/u repeat lactate level    =================GI====================================  -no issues  -place ng tube for feeds    ================ Heme/onc==================================  Anemia:  -h/h noted 11/36,   -f/u anemia panel, b12 and folate levels  -monitor h/h    DVT:  -pt on eliquis at home  -start on full dose lovenox for now    Hx of uterine cancer:  -s/p PA and RT in 2016, patient has not followed with oncologist since beginning of pandemic    =================Endocrine===============================  DM:  -pt on glipizide at home, hold OHA  -start HSS  -monitor fs q6hrs, f/u a1c    ================= Skin/Catheters============================  No rashes. Peripheral IV lines. RIN central line    - =================Prophylaxis =============================  lovenox for hx of DVT  Protonix for  GI proph    ==================GOC==================================   FULL CODE as discussed with daughter

## 2020-12-15 NOTE — H&P ADULT - NSICDXPASTMEDICALHX_GEN_ALL_CORE_FT
PAST MEDICAL HISTORY:  Anxiety     DM (diabetes mellitus)     DVT, lower extremity     Uterine cancer

## 2020-12-15 NOTE — H&P ADULT - ATTENDING COMMENTS
Admitted with multiple critical care issues including   Acute hypoxic and hypercapneic resp failure likely 2/2 BL Multilobar PNA  Acute metabolic encephalopathy   Moderate protein calorie malnutrition   Shock likely septic form PNA   Lactic acidosis     Will initiate broad spectrum empiric abx coverage for HCAP organisms given recent hospitalization FU Cultures  Full a/c   DVT and stress ulcer prophylaxis   Bowel regimen   HoB at 30   Continue lung protective mechanical ventilatory support with sedation/analgesia to facilitate vent synchrony and daily SATs/SBTs   Optimize enteral nutrition   Glycemic control keep Glu 140 -180   Homeostatic balance for electrolytes - monitor and correct as indicated

## 2020-12-16 DIAGNOSIS — A41.9 SEPSIS, UNSPECIFIED ORGANISM: ICD-10-CM

## 2020-12-16 DIAGNOSIS — J96.21 ACUTE AND CHRONIC RESPIRATORY FAILURE WITH HYPOXIA: ICD-10-CM

## 2020-12-16 DIAGNOSIS — C34.90 MALIGNANT NEOPLASM OF UNSPECIFIED PART OF UNSPECIFIED BRONCHUS OR LUNG: ICD-10-CM

## 2020-12-16 DIAGNOSIS — Z51.5 ENCOUNTER FOR PALLIATIVE CARE: ICD-10-CM

## 2020-12-16 DIAGNOSIS — R53.81 OTHER MALAISE: ICD-10-CM

## 2020-12-16 DIAGNOSIS — E43 UNSPECIFIED SEVERE PROTEIN-CALORIE MALNUTRITION: ICD-10-CM

## 2020-12-16 LAB
A1C WITH ESTIMATED AVERAGE GLUCOSE RESULT: 5.6 % — SIGNIFICANT CHANGE UP (ref 4–5.6)
ALBUMIN SERPL ELPH-MCNC: 2.2 G/DL — LOW (ref 3.5–5)
ALP SERPL-CCNC: 91 U/L — SIGNIFICANT CHANGE UP (ref 40–120)
ALT FLD-CCNC: 17 U/L DA — SIGNIFICANT CHANGE UP (ref 10–60)
ANION GAP SERPL CALC-SCNC: 13 MMOL/L — SIGNIFICANT CHANGE UP (ref 5–17)
ANION GAP SERPL CALC-SCNC: 9 MMOL/L — SIGNIFICANT CHANGE UP (ref 5–17)
AST SERPL-CCNC: 37 U/L — SIGNIFICANT CHANGE UP (ref 10–40)
BASE EXCESS BLDA CALC-SCNC: -1.5 MMOL/L — SIGNIFICANT CHANGE UP (ref -2–2)
BASOPHILS # BLD AUTO: 0.01 K/UL — SIGNIFICANT CHANGE UP (ref 0–0.2)
BASOPHILS NFR BLD AUTO: 0.1 % — SIGNIFICANT CHANGE UP (ref 0–2)
BILIRUB SERPL-MCNC: 0.4 MG/DL — SIGNIFICANT CHANGE UP (ref 0.2–1.2)
BLOOD GAS COMMENTS ARTERIAL: SIGNIFICANT CHANGE UP
BUN SERPL-MCNC: 34 MG/DL — HIGH (ref 7–18)
BUN SERPL-MCNC: 36 MG/DL — HIGH (ref 7–18)
CALCIUM SERPL-MCNC: 9.4 MG/DL — SIGNIFICANT CHANGE UP (ref 8.4–10.5)
CALCIUM SERPL-MCNC: 9.5 MG/DL — SIGNIFICANT CHANGE UP (ref 8.4–10.5)
CHLORIDE SERPL-SCNC: 100 MMOL/L — SIGNIFICANT CHANGE UP (ref 96–108)
CHLORIDE SERPL-SCNC: 99 MMOL/L — SIGNIFICANT CHANGE UP (ref 96–108)
CHOLEST SERPL-MCNC: 192 MG/DL — SIGNIFICANT CHANGE UP
CO2 SERPL-SCNC: 22 MMOL/L — SIGNIFICANT CHANGE UP (ref 22–31)
CO2 SERPL-SCNC: 26 MMOL/L — SIGNIFICANT CHANGE UP (ref 22–31)
CREAT SERPL-MCNC: 0.53 MG/DL — SIGNIFICANT CHANGE UP (ref 0.5–1.3)
CREAT SERPL-MCNC: 0.58 MG/DL — SIGNIFICANT CHANGE UP (ref 0.5–1.3)
CULTURE RESULTS: NO GROWTH — SIGNIFICANT CHANGE UP
EOSINOPHIL # BLD AUTO: 0.01 K/UL — SIGNIFICANT CHANGE UP (ref 0–0.5)
EOSINOPHIL NFR BLD AUTO: 0.1 % — SIGNIFICANT CHANGE UP (ref 0–6)
ESTIMATED AVERAGE GLUCOSE: 114 MG/DL — SIGNIFICANT CHANGE UP (ref 68–114)
FERRITIN SERPL-MCNC: 1503 NG/ML — HIGH (ref 15–150)
FOLATE SERPL-MCNC: <2 NG/ML — LOW
GLUCOSE SERPL-MCNC: 84 MG/DL — SIGNIFICANT CHANGE UP (ref 70–99)
GLUCOSE SERPL-MCNC: 93 MG/DL — SIGNIFICANT CHANGE UP (ref 70–99)
HCO3 BLDA-SCNC: 22 MMOL/L — LOW (ref 23–27)
HCT VFR BLD CALC: 32.4 % — LOW (ref 34.5–45)
HDLC SERPL-MCNC: 59 MG/DL — SIGNIFICANT CHANGE UP
HGB BLD-MCNC: 9.7 G/DL — LOW (ref 11.5–15.5)
HOROWITZ INDEX BLDA+IHG-RTO: 70 — SIGNIFICANT CHANGE UP
IMM GRANULOCYTES NFR BLD AUTO: 0.6 % — SIGNIFICANT CHANGE UP (ref 0–1.5)
IRON SATN MFR SERPL: 20 % — SIGNIFICANT CHANGE UP (ref 15–50)
IRON SATN MFR SERPL: 42 UG/DL — SIGNIFICANT CHANGE UP (ref 40–150)
LACTATE SERPL-SCNC: 3.4 MMOL/L — HIGH (ref 0.7–2)
LACTATE SERPL-SCNC: 3.5 MMOL/L — HIGH (ref 0.7–2)
LACTATE SERPL-SCNC: 3.8 MMOL/L — HIGH (ref 0.7–2)
LACTATE SERPL-SCNC: 4.8 MMOL/L — CRITICAL HIGH (ref 0.7–2)
LIPID PNL WITH DIRECT LDL SERPL: 100 MG/DL — HIGH
LYMPHOCYTES # BLD AUTO: 0.41 K/UL — LOW (ref 1–3.3)
LYMPHOCYTES # BLD AUTO: 3.3 % — LOW (ref 13–44)
MAGNESIUM SERPL-MCNC: 1.9 MG/DL — SIGNIFICANT CHANGE UP (ref 1.6–2.6)
MCHC RBC-ENTMCNC: 28.4 PG — SIGNIFICANT CHANGE UP (ref 27–34)
MCHC RBC-ENTMCNC: 29.9 GM/DL — LOW (ref 32–36)
MCV RBC AUTO: 95 FL — SIGNIFICANT CHANGE UP (ref 80–100)
MONOCYTES # BLD AUTO: 0.66 K/UL — SIGNIFICANT CHANGE UP (ref 0–0.9)
MONOCYTES NFR BLD AUTO: 5.3 % — SIGNIFICANT CHANGE UP (ref 2–14)
MRSA PCR RESULT.: SIGNIFICANT CHANGE UP
NEUTROPHILS # BLD AUTO: 11.3 K/UL — HIGH (ref 1.8–7.4)
NEUTROPHILS NFR BLD AUTO: 90.6 % — HIGH (ref 43–77)
NON HDL CHOLESTEROL: 133 MG/DL — HIGH
NRBC # BLD: 0 /100 WBCS — SIGNIFICANT CHANGE UP (ref 0–0)
PCO2 BLDA: 35 MMHG — SIGNIFICANT CHANGE UP (ref 32–46)
PH BLDA: 7.42 — SIGNIFICANT CHANGE UP (ref 7.35–7.45)
PHOSPHATE SERPL-MCNC: 0.7 MG/DL — CRITICAL LOW (ref 2.5–4.5)
PHOSPHATE SERPL-MCNC: 4.7 MG/DL — HIGH (ref 2.5–4.5)
PLATELET # BLD AUTO: 257 K/UL — SIGNIFICANT CHANGE UP (ref 150–400)
PO2 BLDA: 103 MMHG — SIGNIFICANT CHANGE UP (ref 74–108)
POTASSIUM SERPL-MCNC: 4.7 MMOL/L — SIGNIFICANT CHANGE UP (ref 3.5–5.3)
POTASSIUM SERPL-MCNC: 5 MMOL/L — SIGNIFICANT CHANGE UP (ref 3.5–5.3)
POTASSIUM SERPL-SCNC: 4.7 MMOL/L — SIGNIFICANT CHANGE UP (ref 3.5–5.3)
POTASSIUM SERPL-SCNC: 5 MMOL/L — SIGNIFICANT CHANGE UP (ref 3.5–5.3)
PROT SERPL-MCNC: 7.3 G/DL — SIGNIFICANT CHANGE UP (ref 6–8.3)
RBC # BLD: 3.41 M/UL — LOW (ref 3.8–5.2)
RBC # FLD: 13.3 % — SIGNIFICANT CHANGE UP (ref 10.3–14.5)
S AUREUS DNA NOSE QL NAA+PROBE: SIGNIFICANT CHANGE UP
SAO2 % BLDA: 98 % — HIGH (ref 92–96)
SARS-COV-2 RNA SPEC QL NAA+PROBE: SIGNIFICANT CHANGE UP
SODIUM SERPL-SCNC: 134 MMOL/L — LOW (ref 135–145)
SODIUM SERPL-SCNC: 135 MMOL/L — SIGNIFICANT CHANGE UP (ref 135–145)
SPECIMEN SOURCE: SIGNIFICANT CHANGE UP
TIBC SERPL-MCNC: 212 UG/DL — LOW (ref 250–450)
TRANSFERRIN SERPL-MCNC: 164 MG/DL — LOW (ref 200–360)
TRIGL SERPL-MCNC: 166 MG/DL — HIGH
TSH SERPL-MCNC: 1.6 UU/ML — SIGNIFICANT CHANGE UP (ref 0.34–4.82)
UIBC SERPL-MCNC: 170 UG/DL — SIGNIFICANT CHANGE UP (ref 110–370)
VIT B12 SERPL-MCNC: 1050 PG/ML — SIGNIFICANT CHANGE UP (ref 232–1245)
WBC # BLD: 12.46 K/UL — HIGH (ref 3.8–10.5)
WBC # FLD AUTO: 12.46 K/UL — HIGH (ref 3.8–10.5)

## 2020-12-16 PROCEDURE — 99222 1ST HOSP IP/OBS MODERATE 55: CPT

## 2020-12-16 PROCEDURE — 99223 1ST HOSP IP/OBS HIGH 75: CPT | Mod: 57

## 2020-12-16 PROCEDURE — 71045 X-RAY EXAM CHEST 1 VIEW: CPT | Mod: 26

## 2020-12-16 RX ORDER — PHENYLEPHRINE HYDROCHLORIDE 10 MG/ML
10 INJECTION INTRAVENOUS
Qty: 160 | Refills: 0 | Status: DISCONTINUED | OUTPATIENT
Start: 2020-12-16 | End: 2020-12-18

## 2020-12-16 RX ORDER — PHENYLEPHRINE HYDROCHLORIDE 10 MG/ML
3.2 INJECTION INTRAVENOUS
Qty: 160 | Refills: 0 | Status: DISCONTINUED | OUTPATIENT
Start: 2020-12-15 | End: 2020-12-16

## 2020-12-16 RX ORDER — PHENYLEPHRINE HYDROCHLORIDE 10 MG/ML
0.22 INJECTION INTRAVENOUS
Qty: 40 | Refills: 0 | Status: DISCONTINUED | OUTPATIENT
Start: 2020-12-16 | End: 2020-12-16

## 2020-12-16 RX ORDER — FENTANYL CITRATE 50 UG/ML
2 INJECTION INTRAVENOUS
Qty: 2500 | Refills: 0 | Status: DISCONTINUED | OUTPATIENT
Start: 2020-12-15 | End: 2020-12-18

## 2020-12-16 RX ORDER — ACETAMINOPHEN 500 MG
1000 TABLET ORAL ONCE
Refills: 0 | Status: COMPLETED | OUTPATIENT
Start: 2020-12-16 | End: 2020-12-16

## 2020-12-16 RX ORDER — PANTOPRAZOLE SODIUM 20 MG/1
40 TABLET, DELAYED RELEASE ORAL DAILY
Refills: 0 | Status: DISCONTINUED | OUTPATIENT
Start: 2020-12-16 | End: 2020-12-18

## 2020-12-16 RX ORDER — NOREPINEPHRINE BITARTRATE/D5W 8 MG/250ML
0.1 PLASTIC BAG, INJECTION (ML) INTRAVENOUS
Qty: 16 | Refills: 0 | Status: DISCONTINUED | OUTPATIENT
Start: 2020-12-16 | End: 2020-12-17

## 2020-12-16 RX ORDER — POTASSIUM PHOSPHATE, MONOBASIC POTASSIUM PHOSPHATE, DIBASIC 236; 224 MG/ML; MG/ML
30 INJECTION, SOLUTION INTRAVENOUS ONCE
Refills: 0 | Status: COMPLETED | OUTPATIENT
Start: 2020-12-16 | End: 2020-12-16

## 2020-12-16 RX ORDER — NOREPINEPHRINE BITARTRATE/D5W 8 MG/250ML
0.1 PLASTIC BAG, INJECTION (ML) INTRAVENOUS
Qty: 16 | Refills: 0 | Status: DISCONTINUED | OUTPATIENT
Start: 2020-12-16 | End: 2020-12-16

## 2020-12-16 RX ORDER — POLYETHYLENE GLYCOL 3350 17 G/17G
17 POWDER, FOR SOLUTION ORAL DAILY
Refills: 0 | Status: DISCONTINUED | OUTPATIENT
Start: 2020-12-16 | End: 2020-12-16

## 2020-12-16 RX ORDER — SODIUM CHLORIDE 9 MG/ML
500 INJECTION INTRAMUSCULAR; INTRAVENOUS; SUBCUTANEOUS ONCE
Refills: 0 | Status: COMPLETED | OUTPATIENT
Start: 2020-12-16 | End: 2020-12-16

## 2020-12-16 RX ORDER — VASOPRESSIN 20 [USP'U]/ML
0.04 INJECTION INTRAVENOUS
Qty: 50 | Refills: 0 | Status: DISCONTINUED | OUTPATIENT
Start: 2020-12-16 | End: 2020-12-18

## 2020-12-16 RX ORDER — VASOPRESSIN 20 [USP'U]/ML
0.04 INJECTION INTRAVENOUS
Qty: 50 | Refills: 0 | Status: DISCONTINUED | OUTPATIENT
Start: 2020-12-16 | End: 2020-12-16

## 2020-12-16 RX ORDER — PHENYLEPHRINE HYDROCHLORIDE 10 MG/ML
7 INJECTION INTRAVENOUS
Qty: 160 | Refills: 0 | Status: DISCONTINUED | OUTPATIENT
Start: 2020-12-16 | End: 2020-12-16

## 2020-12-16 RX ORDER — POLYETHYLENE GLYCOL 3350 17 G/17G
17 POWDER, FOR SOLUTION ORAL DAILY
Refills: 0 | Status: DISCONTINUED | OUTPATIENT
Start: 2020-12-16 | End: 2020-12-18

## 2020-12-16 RX ADMIN — PHENYLEPHRINE HYDROCHLORIDE 117 MICROGRAM(S)/KG/MIN: 10 INJECTION INTRAVENOUS at 21:02

## 2020-12-16 RX ADMIN — CEFEPIME 100 MILLIGRAM(S): 1 INJECTION, POWDER, FOR SOLUTION INTRAMUSCULAR; INTRAVENOUS at 05:02

## 2020-12-16 RX ADMIN — PHENYLEPHRINE HYDROCHLORIDE 53.5 MICROGRAM(S)/KG/MIN: 10 INJECTION INTRAVENOUS at 01:02

## 2020-12-16 RX ADMIN — CHLORHEXIDINE GLUCONATE 15 MILLILITER(S): 213 SOLUTION TOPICAL at 17:05

## 2020-12-16 RX ADMIN — CHLORHEXIDINE GLUCONATE 1 APPLICATION(S): 213 SOLUTION TOPICAL at 05:02

## 2020-12-16 RX ADMIN — POLYETHYLENE GLYCOL 3350 17 GRAM(S): 17 POWDER, FOR SOLUTION ORAL at 14:13

## 2020-12-16 RX ADMIN — Medication 1000 MILLIGRAM(S): at 08:10

## 2020-12-16 RX ADMIN — SODIUM CHLORIDE 500 MILLILITER(S): 9 INJECTION INTRAMUSCULAR; INTRAVENOUS; SUBCUTANEOUS at 23:31

## 2020-12-16 RX ADMIN — POTASSIUM PHOSPHATE, MONOBASIC POTASSIUM PHOSPHATE, DIBASIC 83.33 MILLIMOLE(S): 236; 224 INJECTION, SOLUTION INTRAVENOUS at 06:45

## 2020-12-16 RX ADMIN — PANTOPRAZOLE SODIUM 40 MILLIGRAM(S): 20 TABLET, DELAYED RELEASE ORAL at 11:29

## 2020-12-16 RX ADMIN — PHENYLEPHRINE HYDROCHLORIDE 3.68 MICROGRAM(S)/KG/MIN: 10 INJECTION INTRAVENOUS at 07:51

## 2020-12-16 RX ADMIN — PHENYLEPHRINE HYDROCHLORIDE 117 MICROGRAM(S)/KG/MIN: 10 INJECTION INTRAVENOUS at 14:07

## 2020-12-16 RX ADMIN — FENTANYL CITRATE 17.8 MICROGRAM(S)/KG/HR: 50 INJECTION INTRAVENOUS at 09:22

## 2020-12-16 RX ADMIN — CEFEPIME 100 MILLIGRAM(S): 1 INJECTION, POWDER, FOR SOLUTION INTRAMUSCULAR; INTRAVENOUS at 22:18

## 2020-12-16 RX ADMIN — CHLORHEXIDINE GLUCONATE 15 MILLILITER(S): 213 SOLUTION TOPICAL at 05:02

## 2020-12-16 RX ADMIN — VASOPRESSIN 2.4 UNIT(S)/MIN: 20 INJECTION INTRAVENOUS at 18:21

## 2020-12-16 RX ADMIN — CEFEPIME 100 MILLIGRAM(S): 1 INJECTION, POWDER, FOR SOLUTION INTRAMUSCULAR; INTRAVENOUS at 13:10

## 2020-12-16 RX ADMIN — PHENYLEPHRINE HYDROCHLORIDE 117 MICROGRAM(S)/KG/MIN: 10 INJECTION INTRAVENOUS at 17:05

## 2020-12-16 RX ADMIN — VASOPRESSIN 2.4 UNIT(S)/MIN: 20 INJECTION INTRAVENOUS at 09:00

## 2020-12-16 RX ADMIN — Medication 400 MILLIGRAM(S): at 07:54

## 2020-12-16 RX ADMIN — Medication 400 MILLIGRAM(S): at 20:51

## 2020-12-16 RX ADMIN — FENTANYL CITRATE 17.8 MICROGRAM(S)/KG/HR: 50 INJECTION INTRAVENOUS at 21:02

## 2020-12-16 RX ADMIN — Medication 1000 MILLIGRAM(S): at 21:21

## 2020-12-16 RX ADMIN — ENOXAPARIN SODIUM 90 MILLIGRAM(S): 100 INJECTION SUBCUTANEOUS at 05:02

## 2020-12-16 RX ADMIN — Medication 8.36 MICROGRAM(S)/KG/MIN: at 18:28

## 2020-12-16 NOTE — CONSULT NOTE ADULT - PROBLEM SELECTOR RECOMMENDATION 3
Daughter reports they were notified of suspected metastatic disease involving lungs and bone prior to admission and plan was for lung bx before patient was admitted to the hospital. Patient was following Dr. Carmenza Barrera [phone]). CT chest indicates Constellation of imaging findings compatible with malignant neoplasm (? bronchogenic or gastrointestinal carcinoma, less likely primary pleural malignancy) as characterized by masslike pleural thickening and pleural-based masses, primarily on the left, + large left pleural effusion in association with complete left lung atelectasis/collapse, numerous right lung nodules and nodular opacities, as well as right hilar and mediastinal lymphadenopathy, + osseous metastases, with lytic, destructive right posterior seventh and left posterior 11th rib lesions, as well as lytic involvement of the T1 and T6 vertebral bodies, including evidence of ventral epidural disease at T6 resulting in some degree of spinal canal stenosis. Plan for chest tube tomorrow.

## 2020-12-16 NOTE — CONSULT NOTE ADULT - ATTENDING COMMENTS
Given high suspicion of COVID I have remotely reviewed the chart of the patient and spoken to the ICU MD as well as palliative. would hold any invasive procedures at this time for two reasons. patient profoundly ill - and unlikely effusion is sole etiology of clinical instability, also patient received full dose lovenox putting her at high risk of bleeding.
75 y F under ICU care for acute hypoxic resp failure, septic shock 2/2 PNA, found to have pleural based masses w osseous metastases. remains intubated, on pressor support. Has been clinically declining since recent hospitalizations, recently dc'd from [facility]. Guarded prognosis. Family to discuss code status, remains FULL CODE at this time. Palliative will follow.

## 2020-12-16 NOTE — CONSULT NOTE ADULT - PROBLEM SELECTOR RECOMMENDATION 6
See GOC section above. Patient's daughter Asad Paz "MELONY" ([phone]) is identified surrogate. At this time, patient remains a full code.

## 2020-12-16 NOTE — CONSULT NOTE ADULT - SUBJECTIVE AND OBJECTIVE BOX
HPI:  74yo female with DM, DVT, Uterine cancer (s/p PA, RTx5, in remission since 2016), anxiety, presented to ER for AMS. Patient in ER was noted to be in respiratory distress with hypercapnia on labs for which she was intubated. As per daughter, patient was diagnosed with DVT in  for which she has been on Eliquis. She later had a fracture of femur in same limb (?pathological fracture), requiring placement of mer. Patient underwent biopsy of bone at that time which was negative for malignancy however remained high suspicion. She had increasing pain, and was diagnosed with fracture of coccyx requiring cement repair following which she was transferred to rehab where she progressively deteriorated. Moreover her o2 sats were noted dropping. She was discharged from rehab on 5l NC about 4 days prior . As per daughter cause was not known. She reports having the patient's scans rechecked with PCP who noted pulmonary nodules and recommended lung biopsy. Patient was discharged to home on friday. She has been complaining of difficulty breathing with a noted cough productive of phlegm. Her breathing was getting labored with increasing disorientation. Patient last night kept removing her NC, found this AM to be lethargic with her o2 off, was disoriented and incoherent hence sent to ER. No reported episodes of fever.  (15 Dec 2020 20:16)    Patient admitted to ICU - remains sedated/intubated, on 3 pressors. +L left collapse. CT Angio Chest indicates masslike pleural thickening and pleural -based masses, large L pleural effusion with complete L lung atelectasis, + osseous mets.       PAST MEDICAL & SURGICAL HISTORY:  Anxiety  DVT, lower extremity  Uterine cancer  DM (diabetes mellitus)  Smoker  Hepatitis C  ~2017, treated  Endometrial cancer  Diabetes mellitus  S/P PA-BSO  History of   History of endometrial biopsy      SOCIAL HISTORY:    Admitted from:  home.   Substance abuse history:       none       Tobacco hx:      none            Alcohol hx:   none           Home Opioid hx: oxycodone PRN  Restorationist:       no Restorationist affiliation                             Preferred Language: English    Surrogate/HCP/Guardian:  Asad Paz (dtr/surrogate): [phone]    FAMILY HISTORY:  No pertinent family history in first degree relatives      Baseline ADLs (prior to admission): Per daughter, patient returned home from [facility] on Friday and has since been minimally verbal 2/2 dyspnea, O2 dependent, + significant weight loss, increasing weakness, now requiring assistance with ADLs    Allergies  No Known Drug Allergies  raspberries (Angioedema)      Present Symptoms:         Review of Systems: Unable to obtain due to poor mentation    MEDICATIONS  (STANDING):  cefepime   IVPB 2000 milliGRAM(s) IV Intermittent every 8 hours  chlorhexidine 0.12% Liquid 15 milliLiter(s) Oral Mucosa every 12 hours  chlorhexidine 2% Cloths 1 Application(s) Topical <User Schedule>  fentaNYL   Infusion 2 MICROgram(s)/kG/Hr (17.8 mL/Hr) IV Continuous <Continuous>  insulin lispro (ADMELOG) corrective regimen sliding scale   SubCutaneous every 6 hours  pantoprazole   Suspension 40 milliGRAM(s) Enteral Tube daily  phenylephrine    Infusion 7 MICROgram(s)/kG/Min (117 mL/Hr) IV Continuous <Continuous>  polyethylene glycol 3350 17 Gram(s) Oral daily  vasopressin Infusion 0.04 Unit(s)/Min (2.4 mL/Hr) IV Continuous <Continuous>    MEDICATIONS  (PRN):  sodium chloride 0.9% lock flush 10 milliLiter(s) IV Push every 1 hour PRN Pre/post blood products, medications, blood draw, and to maintain line patency      PHYSICAL EXAM:    Vital Signs Last 24 Hrs  T(C): 37.3 (16 Dec 2020 17:17), Max: 38.1 (16 Dec 2020 08:00)  T(F): 99.2 (16 Dec 2020 17:17), Max: 100.5 (16 Dec 2020 08:00)  HR: 115 (16 Dec 2020 16:45) (84 - 132)  BP: 88/67 (16 Dec 2020 16:45) (80/49 - 154/63)  BP(mean): 64 (16 Dec 2020 16:45) (55 - 87)  RR: 0 (16 Dec 2020 16:45) (0 - 23)  SpO2: 92% (16 Dec 2020 16:45) (91% - 100%)    General: Patient not medically stable for full physical exam. Patient sedated/intubated, on 3 pressors.   Karnofsky Performance Score/Palliative Performance Status Version2:     20%    HEENT:   ET tube  Lungs: comfortable, on ventilator. Continues fentanyl  CV:    tachycardia, on 3 pressors  GI:   incontinent, NGT  :    seay  Musculoskeletal: patient sedated/intubated; dependent on ADLs  Skin: scrum stage 2  Neuro: patient now sedated/intubated, dependent on ADLs  Oral intake ability: unable/only mouth care    Diet: NPO    LABS:                        9.7    12.46 )-----------( 257      ( 16 Dec 2020 05:11 )             32.4     12-16    135  |  100  |  34<H>  ----------------------------<  93  5.0   |  22  |  0.58    Ca    9.5      16 Dec 2020 05:11  Phos  4.7     12-16  Mg     1.9     16    TPro  7.3  /  Alb  2.2<L>  /  TBili  0.4  /  DBili  x   /  AST  37  /  ALT  17  /  AlkPhos  91  -    Urinalysis Basic - ( 15 Dec 2020 16:03 )    Color: Yellow / Appearance: Clear / S.020 / pH: x  Gluc: x / Ketone: Small  / Bili: Negative / Urobili: 1   Blood: x / Protein: 30 mg/dL / Nitrite: Negative   Leuk Esterase: Trace / RBC: 10-25 /HPF / WBC 6-10 /HPF   Sq Epi: x / Non Sq Epi: Few /HPF / Bacteria: Few /HPF        RADIOLOGY & ADDITIONAL STUDIES:  < from: CT Head No Cont (12.15.20 @ 22:15) >  EXAM:  CT BRAIN                        PROCEDURE DATE:  12/15/2020    INTERPRETATION:  HISTORY: Confusion.  COMPARISON: None.  TECHNIQUE: Axial noncontrast CT images from the skull base to the vertex were obtained and submitted for interpretation. Coronal and sagittal reformatted images were performed. Bone and soft tissue windows were evaluated.    FINDINGS:  There is no acute intracranial mass-effect, hemorrhage, midline shift, or abnormal extra-axial fluid collection. Gray-white differentiation is maintained.  Mild to moderate chronic microvascular ischemic changes. Ventricles, sulci, and cisterns are normal in size for the patient's age without hydrocephalus. Basal cisterns are patent.  Visualized paranasal sinuses and mastoid air cells are clear. Calvarium is intact.  Endotracheal tube is partially imaged.    IMPRESSION:  No acute intracranial bleeding. Chronic microvascular ischemic changes.  < end of copied text >    < from: CT Angio Chest w/ IV Cont (12.15.20 @ 22:15) >  EXAM:  CT ANGIO CHEST (W)AW IC                        PROCEDURE DATE:  12/15/2020    INTERPRETATION:  CLINICAL INFORMATION: Respiratory failure.  COMPARISON: None.  PROCEDURE:  CT Angiography of the Chest.  90 ml of Omnipaque 350 was injected intravenously. 10 ml were discarded.  Sagittal and coronal reformats were performed as well as 3D (MIP) reconstructions.  FINDINGS:  LUNGS, AIRWAYS, PLEURA: An endotracheal tube is in satisfactory position. There is mucoid impaction of the left mainstem bronchus and complete left lung atelectasis/collapse in the setting of a large left pleural effusion. There is mucoid impaction of right lower lobe superior segmental bronchi. There is masslike left pleural enhancement and numerous enhancing pleural nodules, likely metastases. There is a small right pleural effusion with adjacent passive atelectasis, partially loculated along the right major fissure, with additional smaller enhancing pleural metastases. There is diffuse right bronchial wall thickening, including scattered lung nodules and nodular opacities, such as along the right major fissure measuring 2 cm on 6:106 and in the right lower lobe on 6:90 measuring 1.6 cm, concerning for metastases. There are superimposed patchy right lung groundglass opacities, septal thickening, and vascular dilatation, especially pronounced in the right upper lobe, concerning for pneumonia.    MEDIASTINUM AND YUE: There is mediastinal and bulky right hilar lymphadenopathy, with subcarinallymphadenopathy measuring 2.1 cm in short axis diameter. Calcifications in association with mediastinal nodes is likely due to old granulomatous disease. There are several mildly prominent cardiophrenic lymph nodes measuring up to 0.8 cm in short axis diameter.    VESSELS: No intraluminal filling defect is identified in the main, lobar, or segmental pulmonary arteries to suggest acute pulmonary thromboembolism. There is mild narrowing of right upper segmental pulmonary arteries due to compressionby adjacent bulky hilar lymphadenopathy. The aortic root and arch are normal in caliber. There are mild calcifications of the aortic root/valve, and irregular, ulcerative plaque involving the descending thoracic and upper abdominal aorta. A right IJ central line terminates in the lower SVC.    HEART: Heart size is normal. No pericardial effusion.    CHEST WALL AND LOWER NECK: Within normal limits.    VISUALIZED UPPER ABDOMEN: Trace ascites. Patchy areas of hepatic hyperattenuation may represent perfusion related changes. Dense calcifications in the periportal region presumably relate to granulomatous lymph nodes. There are findings of peritoneal carcinomatosis with stranding in the omentum, peritoneal thickening/enhancement, and multiple enhancing peritoneal soft tissue nodules. Multiple prominent gastrohepatic and retroperitoneal lymph nodes are also seen, likely metastatic. There is nonspecific thickening and hyperenhancement of the adrenal glands. There is a too small to characterize right renal hypodensity, potentially a cyst.    BONES: There is diffuse osseous metastatic disease, with involvement of the T1 and T6 vertebral bodies, including mild T6 pathologic wedge compression fracture with evidence of ventral epidural disease resulting in at least mild central spinal canal stenosis. There is lytic osseous destruction of the right posterior seventh and left posterior 11th ribs with associated soft tissue components. Additional lytic metastases are seen involving the sternal body and left scapula.    IMPRESSION:  Constellation of imaging findings compatible with malignant neoplasm (? bronchogenic or gastrointestinal carcinoma, less likely primary pleural malignancy) as characterized by masslike pleural thickening and pleural-based masses, primarily on the left, large left pleural effusion in association with complete left lung atelectasis/collapse, numerous right lung nodules and nodular opacities, as well as right hilar and mediastinal lymphadenopathy. Additional findings of osseous metastases, with lytic, destructive right posterior seventh and left posterior 11th rib lesions, as well as lytic involvement of the T1 and T6 vertebral bodies, including evidence of ventral epidural disease at T6 resulting in some degree of spinal canal stenosis. Further workup with contrast-enhanced MRI of the total spine is recommended to evaluate for neural element integrity.    Patchy right lung groundglass opacities, septal thickening, and vascular engorgement may be related to superimposed pneumonia, with special consideration to atypical etiology such as COVID-19.    No evidence of acute pulmonary thromboembolism.    Limited evaluation of the upper abdomen reveals findings concerning for peritoneal carcinomatosis, for which further workup with contrast-enhanced CT abdomen and pelvis is recommended.  < end of copied text >    < from: Xray Chest 1 View- PORTABLE-Routine (Xray Chest 1 View- PORTABLE-Routine in AM.) (12.16.20 @ 11:11) >  EXAM:  XR CHEST PORTABLE ROUTINE 1V                        PROCEDURE DATE:  2020    INTERPRETATION:  Chest one view  HISTORY: Intubation  COMPARISON STUDY: 12/15/2020    Frontal expiratory view of the chest shows the heart to bedifficult to evaluate because of continued left chest whiteout. Endotracheal tube and right jugular line remain present. Feeding tube is been advanced at least to the stomach.    The lungs show similar right lung infiltrates and there is no evidence of pneumothorax.    IMPRESSION:  Feeding tube to the stomach.    < end of copied text >      ADVANCE DIRECTIVES: Full code

## 2020-12-16 NOTE — CONSULT NOTE ADULT - PROBLEM SELECTOR RECOMMENDATION 9
2/2 PNA; comorbid shock, metastatic disease. CT chest indicates Constellation of imaging findings compatible with malignant neoplasm (? bronchogenic or gastrointestinal carcinoma, less likely primary pleural malignancy) as characterized by masslike pleural thickening and pleural-based masses, primarily on the left, + large left pleural effusion in association with complete left lung atelectasis/collapse, numerous right lung nodules and nodular opacities, as well as right hilar and mediastinal lymphadenopathy, + osseous metastases, with lytic, destructive right posterior seventh and left posterior 11th rib lesions, as well as lytic involvement of the T1 and T6 vertebral bodies, including evidence of ventral epidural disease at T6 resulting in some degree of spinal canal stenosis, + Patchy right lung groundglass opacities. Patient utilizes O2 at baseline per family report. Patient remains sedated/intubated, on IV abx, 3 pressors. Thoracic consult pending. Patient's daughter is primary surrogate; Full code.

## 2020-12-16 NOTE — DIETITIAN INITIAL EVALUATION ADULT. - ENTERAL
Nepro 30x24 + 1 Pkt Prosource BID). Nepro 720 ml, 1296 kcals, 58 gm protein. 2 Prosoure add 30 gm protein, 120 kcals.

## 2020-12-16 NOTE — DIETITIAN INITIAL EVALUATION ADULT. - PERTINENT MEDS FT
MEDICATIONS  (STANDING):  cefepime   IVPB 2000 milliGRAM(s) IV Intermittent every 8 hours  chlorhexidine 0.12% Liquid 15 milliLiter(s) Oral Mucosa every 12 hours  chlorhexidine 2% Cloths 1 Application(s) Topical <User Schedule>  fentaNYL   Infusion 2 MICROgram(s)/kG/Hr (17.8 mL/Hr) IV Continuous <Continuous>  insulin lispro (ADMELOG) corrective regimen sliding scale   SubCutaneous every 6 hours  pantoprazole   Suspension 40 milliGRAM(s) Enteral Tube daily  phenylephrine    Infusion 7 MICROgram(s)/kG/Min (117 mL/Hr) IV Continuous <Continuous>  polyethylene glycol 3350 17 Gram(s) Oral daily  vasopressin Infusion 0.04 Unit(s)/Min (2.4 mL/Hr) IV Continuous <Continuous>    MEDICATIONS  (PRN):  sodium chloride 0.9% lock flush 10 milliLiter(s) IV Push every 1 hour PRN Pre/post blood products, medications, blood draw, and to maintain line patency

## 2020-12-16 NOTE — CONSULT NOTE ADULT - CONVERSATION DETAILS
12/16/20: Spoke with patient's daughter on the phone. Obtained baseline mental/functional status. Daughter reports patient with significant decline and weight loss upon returning home from rehab and with suspected metastatic disease, + O2 dependent. Discussed status; reviewed medications, labs, diagnostics. Discussed risks vs benefits of resuscitation. Daughter states patient never discussed her wishes; however, she states she has been discussing with her brother and while they recognize CPR would not be beneficial given metastatic disease, they request patient remain a FULL CODE at this time pending further work up.  All questions answered; supportive counseling provided.

## 2020-12-16 NOTE — CONSULT NOTE ADULT - PROBLEM SELECTOR RECOMMENDATION 2
2/2 PNA. CT chest indicates + Patchy right lung groundglass opacities. Patient utilizes O2 at baseline per family report. Patient remains sedated/intubated, on IV abx, 3 pressors. Patient's daughter is primary surrogate; Full code.

## 2020-12-16 NOTE — DIETITIAN INITIAL EVALUATION ADULT. - PERTINENT LABORATORY DATA
12-16 Na135 mmol/L Glu 93 mg/dL K+ 5.0 mmol/L Cr  0.58 mg/dL BUN 34 mg/dL<H>   12-16 Phos 4.7 mg/dL<H>   12-16 Alb 2.2 g/dL<L>     12-16 Chol 192 mg/dL LDL --    HDL 59 mg/dL Trig 166 mg/dL<H>    12-16-20 @ 09:20 HgbA1C 5.6

## 2020-12-16 NOTE — PROGRESS NOTE ADULT - SUBJECTIVE AND OBJECTIVE BOX
INTERVAL HPI/OVERNIGHT EVENTS: ***    PRESSORS: [ ] YES [ ] NO  WHICH:    ANTIBIOTICS:                  DATE STARTED:  ANTIBIOTICS:                  DATE STARTED:  ANTIBIOTICS:                  DATE STARTED:    Antimicrobial:  cefepime   IVPB 2000 milliGRAM(s) IV Intermittent every 8 hours    Cardiovascular:  phenylephrine    Infusion 7 MICROgram(s)/kG/Min IV Continuous <Continuous>    Pulmonary:    Hematalogic:    Other:  chlorhexidine 0.12% Liquid 15 milliLiter(s) Oral Mucosa every 12 hours  chlorhexidine 2% Cloths 1 Application(s) Topical <User Schedule>  fentaNYL   Infusion 2 MICROgram(s)/kG/Hr IV Continuous <Continuous>  insulin lispro (ADMELOG) corrective regimen sliding scale   SubCutaneous every 6 hours  pantoprazole  Injectable 40 milliGRAM(s) IV Push daily  polyethylene glycol 3350 17 Gram(s) Oral daily  sodium chloride 0.9% lock flush 10 milliLiter(s) IV Push every 1 hour PRN  vasopressin Infusion 0.04 Unit(s)/Min IV Continuous <Continuous>    cefepime   IVPB 2000 milliGRAM(s) IV Intermittent every 8 hours  chlorhexidine 0.12% Liquid 15 milliLiter(s) Oral Mucosa every 12 hours  chlorhexidine 2% Cloths 1 Application(s) Topical <User Schedule>  fentaNYL   Infusion 2 MICROgram(s)/kG/Hr IV Continuous <Continuous>  insulin lispro (ADMELOG) corrective regimen sliding scale   SubCutaneous every 6 hours  pantoprazole  Injectable 40 milliGRAM(s) IV Push daily  phenylephrine    Infusion 7 MICROgram(s)/kG/Min IV Continuous <Continuous>  polyethylene glycol 3350 17 Gram(s) Oral daily  sodium chloride 0.9% lock flush 10 milliLiter(s) IV Push every 1 hour PRN  vasopressin Infusion 0.04 Unit(s)/Min IV Continuous <Continuous>    Drug Dosing Weight  Height (cm): 154.9 (15 Dec 2020 14:42)  Weight (kg): 89.2 (15 Dec 2020 22:30)  BMI (kg/m2): 37.2 (15 Dec 2020 22:30)  BSA (m2): 1.88 (15 Dec 2020 22:30)    CENTRAL LINE: [ ] YES [ ] NO  LOCATION:   DATE INSERTED:    NICOLE: [ ] YES [ ] NO    DATE INSERTED:    A-LINE:  [ ] YES [ ] NO  LOCATION:   DATE INSERTED:    ICU Vital Signs Last 24 Hrs  T(C): 37.8 (16 Dec 2020 11:30), Max: 38.1 (16 Dec 2020 08:00)  T(F): 100 (16 Dec 2020 11:30), Max: 100.5 (16 Dec 2020 08:00)  HR: 114 (16 Dec 2020 12:15) (84 - 132)  BP: 93/60 (16 Dec 2020 12:15) (80/49 - 154/63)  BP(mean): 65 (16 Dec 2020 12:15) (55 - 87)  ABP: --  ABP(mean): --  RR: 16 (16 Dec 2020 12:15) (11 - 23)  SpO2: 94% (16 Dec 2020 12:15) (92% - 100%)      ABG - ( 16 Dec 2020 04:09 )  pH, Arterial: 7.42  pH, Blood: x     /  pCO2: 35    /  pO2: 103   / HCO3: 22    / Base Excess: -1.5  /  SaO2: 98                    -15 @ 07:01  -  12-16 @ 07:00  --------------------------------------------------------  IN: 1658 mL / OUT: 750 mL / NET: 908 mL        Mode: AC/ CMV (Assist Control/ Continuous Mandatory Ventilation)  RR (machine): 20  TV (machine): 450  FiO2: 70  PEEP: 5  ITime: 1  MAP: 15  PIP: 37      REVIEW OF SYSTEMS:    CONSTITUTIONAL: No weakness, fevers or chills  NECK: No pain or stiffnes  RESPIRATORY: ++ cough, +wheezing, + dyspnea,   CARDIOVASCULAR: No chest pain or palpitations  GASTROINTESTINAL: No abdominal or epigastric pain. No nausea, vomiting, No diarrhea or constipation. No melena or hematochezia.  GENITOURINARY: No dysuria, frequency or hematuria  NEUROLOGICAL: No numbness or weakness  All other review of systems is negative unless indicated above    PHYSICAL EXAM:    >>> <<<    LABS:  CBC Full  -  ( 16 Dec 2020 05:11 )  WBC Count : 12.46 K/uL  RBC Count : 3.41 M/uL  Hemoglobin : 9.7 g/dL  Hematocrit : 32.4 %  Platelet Count - Automated : 257 K/uL  Mean Cell Volume : 95.0 fl  Mean Cell Hemoglobin : 28.4 pg  Mean Cell Hemoglobin Concentration : 29.9 gm/dL  Auto Neutrophil # : 11.30 K/uL  Auto Lymphocyte # : 0.41 K/uL  Auto Monocyte # : 0.66 K/uL  Auto Eosinophil # : 0.01 K/uL  Auto Basophil # : 0.01 K/uL  Auto Neutrophil % : 90.6 %  Auto Lymphocyte % : 3.3 %  Auto Monocyte % : 5.3 %  Auto Eosinophil % : 0.1 %  Auto Basophil % : 0.1 %        135  |  100  |  34<H>  ----------------------------<  93  5.0   |  22  |  0.58    Ca    9.5      16 Dec 2020 05:11  Phos  0.7       Mg     1.9         TPro  7.3  /  Alb  2.2<L>  /  TBili  0.4  /  DBili  x   /  AST  37  /  ALT  17  /  AlkPhos  91      PT/INR - ( 15 Dec 2020 16:02 )   PT: 23.3 sec;   INR: 2.02 ratio         PTT - ( 15 Dec 2020 16:02 )  PTT:26.1 sec  Urinalysis Basic - ( 15 Dec 2020 16:03 )    Color: Yellow / Appearance: Clear / S.020 / pH: x  Gluc: x / Ketone: Small  / Bili: Negative / Urobili: 1   Blood: x / Protein: 30 mg/dL / Nitrite: Negative   Leuk Esterase: Trace / RBC: 10-25 /HPF / WBC 6-10 /HPF   Sq Epi: x / Non Sq Epi: Few /HPF / Bacteria: Few /HPF          RADIOLOGY & ADDITIONAL STUDIES REVIEWED:  ***    [ ]GOALS OF CARE DISCUSSION WITH PATIENT/FAMILY/PROXY:    CRITICAL CARE TIME SPENT: 35 minutes INTERVAL HPI/OVERNIGHT EVENTS: Patient     PRESSORS: [ ] YES [ ] NO  WHICH:    ANTIBIOTICS:                  DATE STARTED:  ANTIBIOTICS:                  DATE STARTED:  ANTIBIOTICS:                  DATE STARTED:    Antimicrobial:  cefepime   IVPB 2000 milliGRAM(s) IV Intermittent every 8 hours    Cardiovascular:  phenylephrine    Infusion 7 MICROgram(s)/kG/Min IV Continuous <Continuous>    Pulmonary:    Hematalogic:    Other:  chlorhexidine 0.12% Liquid 15 milliLiter(s) Oral Mucosa every 12 hours  chlorhexidine 2% Cloths 1 Application(s) Topical <User Schedule>  fentaNYL   Infusion 2 MICROgram(s)/kG/Hr IV Continuous <Continuous>  insulin lispro (ADMELOG) corrective regimen sliding scale   SubCutaneous every 6 hours  pantoprazole  Injectable 40 milliGRAM(s) IV Push daily  polyethylene glycol 3350 17 Gram(s) Oral daily  sodium chloride 0.9% lock flush 10 milliLiter(s) IV Push every 1 hour PRN  vasopressin Infusion 0.04 Unit(s)/Min IV Continuous <Continuous>    cefepime   IVPB 2000 milliGRAM(s) IV Intermittent every 8 hours  chlorhexidine 0.12% Liquid 15 milliLiter(s) Oral Mucosa every 12 hours  chlorhexidine 2% Cloths 1 Application(s) Topical <User Schedule>  fentaNYL   Infusion 2 MICROgram(s)/kG/Hr IV Continuous <Continuous>  insulin lispro (ADMELOG) corrective regimen sliding scale   SubCutaneous every 6 hours  pantoprazole  Injectable 40 milliGRAM(s) IV Push daily  phenylephrine    Infusion 7 MICROgram(s)/kG/Min IV Continuous <Continuous>  polyethylene glycol 3350 17 Gram(s) Oral daily  sodium chloride 0.9% lock flush 10 milliLiter(s) IV Push every 1 hour PRN  vasopressin Infusion 0.04 Unit(s)/Min IV Continuous <Continuous>    Drug Dosing Weight  Height (cm): 154.9 (15 Dec 2020 14:42)  Weight (kg): 89.2 (15 Dec 2020 22:30)  BMI (kg/m2): 37.2 (15 Dec 2020 22:30)  BSA (m2): 1.88 (15 Dec 2020 22:30)    CENTRAL LINE: [ ] YES [ ] NO  LOCATION:   DATE INSERTED:    NICOLE: [ ] YES [ ] NO    DATE INSERTED:    A-LINE:  [ ] YES [ ] NO  LOCATION:   DATE INSERTED:    ICU Vital Signs Last 24 Hrs  T(C): 37.8 (16 Dec 2020 11:30), Max: 38.1 (16 Dec 2020 08:00)  T(F): 100 (16 Dec 2020 11:30), Max: 100.5 (16 Dec 2020 08:00)  HR: 114 (16 Dec 2020 12:15) (84 - 132)  BP: 93/60 (16 Dec 2020 12:15) (80/49 - 154/63)  BP(mean): 65 (16 Dec 2020 12:15) (55 - 87)  ABP: --  ABP(mean): --  RR: 16 (16 Dec 2020 12:15) (11 - 23)  SpO2: 94% (16 Dec 2020 12:15) (92% - 100%)      ABG - ( 16 Dec 2020 04:09 )  pH, Arterial: 7.42  pH, Blood: x     /  pCO2: 35    /  pO2: 103   / HCO3: 22    / Base Excess: -1.5  /  SaO2: 98                    12-15 @ 07:01  -  12-16 @ 07:00  --------------------------------------------------------  IN: 1658 mL / OUT: 750 mL / NET: 908 mL        Mode: AC/ CMV (Assist Control/ Continuous Mandatory Ventilation)  RR (machine): 20  TV (machine): 450  FiO2: 70  PEEP: 5  ITime: 1  MAP: 15  PIP: 37      REVIEW OF SYSTEMS:    CONSTITUTIONAL: No weakness, fevers or chills  NECK: No pain or stiffnes  RESPIRATORY: ++ cough, +wheezing, + dyspnea,   CARDIOVASCULAR: No chest pain or palpitations  GASTROINTESTINAL: No abdominal or epigastric pain. No nausea, vomiting, No diarrhea or constipation. No melena or hematochezia.  GENITOURINARY: No dysuria, frequency or hematuria  NEUROLOGICAL: No numbness or weakness  All other review of systems is negative unless indicated above    PHYSICAL EXAM:    >>> <<<    LABS:  CBC Full  -  ( 16 Dec 2020 05:11 )  WBC Count : 12.46 K/uL  RBC Count : 3.41 M/uL  Hemoglobin : 9.7 g/dL  Hematocrit : 32.4 %  Platelet Count - Automated : 257 K/uL  Mean Cell Volume : 95.0 fl  Mean Cell Hemoglobin : 28.4 pg  Mean Cell Hemoglobin Concentration : 29.9 gm/dL  Auto Neutrophil # : 11.30 K/uL  Auto Lymphocyte # : 0.41 K/uL  Auto Monocyte # : 0.66 K/uL  Auto Eosinophil # : 0.01 K/uL  Auto Basophil # : 0.01 K/uL  Auto Neutrophil % : 90.6 %  Auto Lymphocyte % : 3.3 %  Auto Monocyte % : 5.3 %  Auto Eosinophil % : 0.1 %  Auto Basophil % : 0.1 %        135  |  100  |  34<H>  ----------------------------<  93  5.0   |  22  |  0.58    Ca    9.5      16 Dec 2020 05:11  Phos  0.7       Mg     1.9         TPro  7.3  /  Alb  2.2<L>  /  TBili  0.4  /  DBili  x   /  AST  37  /  ALT  17  /  AlkPhos  91      PT/INR - ( 15 Dec 2020 16:02 )   PT: 23.3 sec;   INR: 2.02 ratio         PTT - ( 15 Dec 2020 16:02 )  PTT:26.1 sec  Urinalysis Basic - ( 15 Dec 2020 16:03 )    Color: Yellow / Appearance: Clear / S.020 / pH: x  Gluc: x / Ketone: Small  / Bili: Negative / Urobili: 1   Blood: x / Protein: 30 mg/dL / Nitrite: Negative   Leuk Esterase: Trace / RBC: 10-25 /HPF / WBC 6-10 /HPF   Sq Epi: x / Non Sq Epi: Few /HPF / Bacteria: Few /HPF          RADIOLOGY & ADDITIONAL STUDIES REVIEWED:  ***    [ ]GOALS OF CARE DISCUSSION WITH PATIENT/FAMILY/PROXY:    CRITICAL CARE TIME SPENT: 35 minutes INTERVAL HPI/OVERNIGHT EVENTS: Patient remained on intubation     PRESSORS: [x ] YES [ ] NO  WHICH:    ANTIBIOTICS:                  DATE STARTED:  ANTIBIOTICS:                  DATE STARTED:  ANTIBIOTICS:                  DATE STARTED:    Antimicrobial:  cefepime   IVPB 2000 milliGRAM(s) IV Intermittent every 8 hours    Cardiovascular:  phenylephrine    Infusion 7 MICROgram(s)/kG/Min IV Continuous <Continuous>  vasopressin     Pulmonary:    Hematalogic:    Other:  chlorhexidine 0.12% Liquid 15 milliLiter(s) Oral Mucosa every 12 hours  chlorhexidine 2% Cloths 1 Application(s) Topical <User Schedule>  fentaNYL   Infusion 2 MICROgram(s)/kG/Hr IV Continuous <Continuous>  insulin lispro (ADMELOG) corrective regimen sliding scale   SubCutaneous every 6 hours  pantoprazole  Injectable 40 milliGRAM(s) IV Push daily  polyethylene glycol 3350 17 Gram(s) Oral daily  sodium chloride 0.9% lock flush 10 milliLiter(s) IV Push every 1 hour PRN  vasopressin Infusion 0.04 Unit(s)/Min IV Continuous <Continuous>    cefepime   IVPB 2000 milliGRAM(s) IV Intermittent every 8 hours  chlorhexidine 0.12% Liquid 15 milliLiter(s) Oral Mucosa every 12 hours  chlorhexidine 2% Cloths 1 Application(s) Topical <User Schedule>  fentaNYL   Infusion 2 MICROgram(s)/kG/Hr IV Continuous <Continuous>  insulin lispro (ADMELOG) corrective regimen sliding scale   SubCutaneous every 6 hours  pantoprazole  Injectable 40 milliGRAM(s) IV Push daily  phenylephrine    Infusion 7 MICROgram(s)/kG/Min IV Continuous <Continuous>  polyethylene glycol 3350 17 Gram(s) Oral daily  sodium chloride 0.9% lock flush 10 milliLiter(s) IV Push every 1 hour PRN  vasopressin Infusion 0.04 Unit(s)/Min IV Continuous <Continuous>    Drug Dosing Weight  Height (cm): 154.9 (15 Dec 2020 14:42)  Weight (kg): 89.2 (15 Dec 2020 22:30)  BMI (kg/m2): 37.2 (15 Dec 2020 22:30)  BSA (m2): 1.88 (15 Dec 2020 22:30)    CENTRAL LINE: [ x] YES [ ] NO  LOCATION:   [facility] 12/15  NICOLE: [ x] YES [ ] NO    DATE INSERTED:    A-LINE:  [ ] YES [ ] NO  LOCATION:   DATE INSERTED:    ICU Vital Signs Last 24 Hrs  T(C): 37.8 (16 Dec 2020 11:30), Max: 38.1 (16 Dec 2020 08:00)  T(F): 100 (16 Dec 2020 11:30), Max: 100.5 (16 Dec 2020 08:00)  HR: 114 (16 Dec 2020 12:15) (84 - 132)  BP: 93/60 (16 Dec 2020 12:15) (80/49 - 154/63)  BP(mean): 65 (16 Dec 2020 12:15) (55 - 87)  ABP: --  ABP(mean): --  RR: 16 (16 Dec 2020 12:15) (11 - 23)  SpO2: 94% (16 Dec 2020 12:15) (92% - 100%)      ABG - ( 16 Dec 2020 04:09 )  pH, Arterial: 7.42  pH, Blood: x     /  pCO2: 35    /  pO2: 103   / HCO3: 22    / Base Excess: -1.5  /  SaO2: 98                    -15 @ 07:01  -  -16 @ 07:00  --------------------------------------------------------  IN: 1658 mL / OUT: 750 mL / NET: 908 mL        Mode: AC/ CMV (Assist Control/ Continuous Mandatory Ventilation)  RR (machine): 20  TV (machine): 450  FiO2: 70  PEEP: 5  ITime: 1  MAP: 15  PIP: 37      REVIEW OF SYSTEMS:  unable to obtain    PHYSICAL EXAM:  GENERAL: sedated   HEAD:  Atraumatic, Normocephalic  EYES: ENT: Moist mucous membranes  NECK: Supple, No JVD  CHEST/LUNG: no bs on L SIDE. rhonchi heard bl   HEART: Regular rate and rhythm; S1+ S2+  ABDOMEN: Bowel sounds present; Soft, Nontender, Nondistended.   EXTREMITIES:  2+ Peripheral Pulses, brisk capillary refill. No clubbing, cyanosis, or edema  NERVOUS SYSTEM: sedated and intubated   SKIN: No rashes or lesions  >>> <<<    LABS:  CBC Full  -  ( 16 Dec 2020 05:11 )  WBC Count : 12.46 K/uL  RBC Count : 3.41 M/uL  Hemoglobin : 9.7 g/dL  Hematocrit : 32.4 %  Platelet Count - Automated : 257 K/uL  Mean Cell Volume : 95.0 fl  Mean Cell Hemoglobin : 28.4 pg  Mean Cell Hemoglobin Concentration : 29.9 gm/dL  Auto Neutrophil # : 11.30 K/uL  Auto Lymphocyte # : 0.41 K/uL  Auto Monocyte # : 0.66 K/uL  Auto Eosinophil # : 0.01 K/uL  Auto Basophil # : 0.01 K/uL  Auto Neutrophil % : 90.6 %  Auto Lymphocyte % : 3.3 %  Auto Monocyte % : 5.3 %  Auto Eosinophil % : 0.1 %  Auto Basophil % : 0.1 %        135  |  100  |  34<H>  ----------------------------<  93  5.0   |  22  |  0.58    Ca    9.5      16 Dec 2020 05:11  Phos  0.7       Mg     1.9         TPro  7.3  /  Alb  2.2<L>  /  TBili  0.4  /  DBili  x   /  AST  37  /  ALT  17  /  AlkPhos  91  12-16    PT/INR - ( 15 Dec 2020 16:02 )   PT: 23.3 sec;   INR: 2.02 ratio         PTT - ( 15 Dec 2020 16:02 )  PTT:26.1 sec  Urinalysis Basic - ( 15 Dec 2020 16:03 )    Color: Yellow / Appearance: Clear / S.020 / pH: x  Gluc: x / Ketone: Small  / Bili: Negative / Urobili: 1   Blood: x / Protein: 30 mg/dL / Nitrite: Negative   Leuk Esterase: Trace / RBC: 10-25 /HPF / WBC 6-10 /HPF   Sq Epi: x / Non Sq Epi: Few /HPF / Bacteria: Few /HPF          RADIOLOGY & ADDITIONAL STUDIES REVIEWED:  ***    [ ]GOALS OF CARE DISCUSSION WITH PATIENT/FAMILY/PROXY:    CRITICAL CARE TIME SPENT: 35 minutes

## 2020-12-16 NOTE — CONSULT NOTE ADULT - PROBLEM SELECTOR RECOMMENDATION 5
Per daughter, patient returned home from [facility] on Friday and has since been minimally verbal 2/2 dyspnea, O2 dependent, + significant weight loss, increasing weakness, now requiring assistance with ADLs. At present, present with acute on chronic respiratory failure, + shock, +metastatic disease.

## 2020-12-16 NOTE — CONSULT NOTE ADULT - PROBLEM SELECTOR RECOMMENDATION 4
Likely 2/2 metastatic disease. Family reports patient with significant weight loss. Albumin 2.2. + skin failure. Patient remains NPO. High risk of skin breakdown.

## 2020-12-16 NOTE — PATIENT PROFILE ADULT - NSPROGENOTHERPROVIDER_GEN_A_NUR
unable to respond due to cognitive limitations
Spine appears normal, range of motion is not limited, no muscle or joint tenderness

## 2020-12-16 NOTE — CONSULT NOTE ADULT - SUBJECTIVE AND OBJECTIVE BOX
Thoracic Surgery Consultation Note    Patient is a 75y old  Female who presents with a chief complaint of respiratory failure (15 Dec 2020 20:16)      HPI:  74yo female with DM, DVT, Uterine cancer (s/p PA, RTx5, in remission since 2016), anxiety, presented to ER for AMS. Patient in ER was noted to be in respiratory distress with hypercapnia on labs for which she was intubated. As per daughter, patient was diagnosed with DVT in  for which she has been on Eliquis. She later had a fracture of femur in same limb (?pathological fracture), requiring placement of mer. Patient underwent biopsy of bone at that time which was negative for malignancy however remained high suspicion. She had increasing pain, and was diagnosed with fracture of coccyx requiring cement repair following which she was transferred to rehab where she progressively deteriorated. Moreover her o2 sats were noted dropping. She was discharged from rehab on 5l NC about 4 days prior . As per daughter cause was not known. She reports having the patient's scans rechecked with PCP who noted pulmonary nodules and recommended lung biopsy. Patient was discharged to home on friday. She has been complaining of difficulty breathing with a noted cough productive of phlegm. Her breathing was getting labored with increasing disorientation. Patient last night kept removing her NC, found this AM to be lethargic with her o2 off, was disoriented and incoherent hence sent to ER. No reported episodes of fever.  (15 Dec 2020 20:16)    INTERVAL HPI:  History as above. Pt intubated and sedated. Thoracic Surgery consulted for large left pleural effusion and findings of known left lung nodules suspicious for carcinoma. Pt currently on bid lovenox for prior history of DVT.     PAST MEDICAL & SURGICAL HISTORY:  Anxiety    DVT, lower extremity    Uterine cancer    DM (diabetes mellitus)    S/P PA-BSO        Allergies    No Known Drug Allergies  raspberries (Angioedema)    MEDICATIONS  (STANDING):  cefepime   IVPB 2000 milliGRAM(s) IV Intermittent every 8 hours  chlorhexidine 0.12% Liquid 15 milliLiter(s) Oral Mucosa every 12 hours  chlorhexidine 2% Cloths 1 Application(s) Topical <User Schedule>  enoxaparin Injectable 90 milliGRAM(s) SubCutaneous two times a day  fentaNYL   Infusion 2 MICROgram(s)/kG/Hr (17.8 mL/Hr) IV Continuous <Continuous>  insulin lispro (ADMELOG) corrective regimen sliding scale   SubCutaneous every 6 hours  norepinephrine Infusion 0.1 MICROgram(s)/kG/Min (8.36 mL/Hr) IV Continuous <Continuous>  pantoprazole  Injectable 40 milliGRAM(s) IV Push daily  phenylephrine    Infusion 7 MICROgram(s)/kG/Min (117 mL/Hr) IV Continuous <Continuous>  polyethylene glycol 3350 17 Gram(s) Oral daily  vasopressin Infusion 0.04 Unit(s)/Min (2.4 mL/Hr) IV Continuous <Continuous>    MEDICATIONS  (PRN):  sodium chloride 0.9% lock flush 10 milliLiter(s) IV Push every 1 hour PRN Pre/post blood products, medications, blood draw, and to maintain line patency      Vital Signs Last 24 Hrs  T(C): 37.8 (16 Dec 2020 09:15), Max: 38.1 (16 Dec 2020 08:00)  T(F): 100 (16 Dec 2020 09:15), Max: 100.5 (16 Dec 2020 08:00)  HR: 115 (16 Dec 2020 09:30) (84 - 132)  BP: 131/71 (16 Dec 2020 09:30) (80/49 - 154/63)  BP(mean): 86 (16 Dec 2020 09:30) (55 - 87)  RR: 11 (16 Dec 2020 09:30) (11 - 23)  SpO2: 98% (16 Dec 2020 09:15) (95% - 100%)    Physical Exam:  Gen: intubated  HEENT: normocephalic, neck soft and supple  Chest: equal chest rise  Abd: soft not distended    Labs:                          9.7    12.46 )-----------( 257      ( 16 Dec 2020 05:11 )             32.4     12-16    135  |  100  |  34<H>  ----------------------------<  93  5.0   |  22  |  0.58    Ca    9.5      16 Dec 2020 05:11  Phos  0.7     12-16  Mg     1.9     12-16    TPro  7.3  /  Alb  2.2<L>  /  TBili  0.4  /  DBili  x   /  AST  37  /  ALT  17  /  AlkPhos  91  12-16    PT/INR - ( 15 Dec 2020 16:02 )   PT: 23.3 sec;   INR: 2.02 ratio         PTT - ( 15 Dec 2020 16:02 )  PTT:26.1 sec  Urinalysis Basic - ( 15 Dec 2020 16:03 )    Color: Yellow / Appearance: Clear / S.020 / pH: x  Gluc: x / Ketone: Small  / Bili: Negative / Urobili: 1   Blood: x / Protein: 30 mg/dL / Nitrite: Negative   Leuk Esterase: Trace / RBC: 10-25 /HPF / WBC 6-10 /HPF   Sq Epi: x / Non Sq Epi: Few /HPF / Bacteria: Few /HPF          Radiological Exams:  c< from: CT Angio Chest w/ IV Cont (12.15.20 @ 22:15) >  EXAM:  CT ANGIO CHEST (W)AW IC                            PROCEDURE DATE:  12/15/2020          INTERPRETATION:  CLINICAL INFORMATION: Respiratory failure.    COMPARISON: None.    PROCEDURE:  CT Angiography of the Chest.  90 ml of Omnipaque 350 was injected intravenously. 10 ml were discarded.  Sagittal and coronal reformats were performed as well as 3D (MIP) reconstructions.    FINDINGS:    LUNGS, AIRWAYS, PLEURA: An endotracheal tube is in satisfactory position. There is mucoid impaction of the left mainstem bronchus and complete left lung atelectasis/collapse in the setting of a large left pleural effusion. There is mucoid impaction of right lower lobe superior segmental bronchi. There is masslike left pleural enhancement and numerous enhancing pleural nodules, likely metastases. There is a small right pleural effusion with adjacent passive atelectasis, partially loculated along the right major fissure, with additional smaller enhancing pleural metastases. There is diffuse right bronchial wall thickening, including scattered lung nodules and nodular opacities, such as along the right major fissure measuring 2 cm on 6:106 and in the right lower lobe on 6:90 measuring 1.6 cm, concerning for metastases. There are superimposed patchy right lung groundglass opacities, septal thickening, and vascular dilatation, especially pronounced in the right upper lobe, concerning for pneumonia.    MEDIASTINUM AND YUE: There is mediastinal and bulky right hilar lymphadenopathy, with subcarinallymphadenopathy measuring 2.1 cm in short axis diameter. Calcifications in association with mediastinal nodes is likely due to old granulomatous disease. There are several mildly prominent cardiophrenic lymph nodes measuring up to 0.8 cm in short axis diameter.    VESSELS: No intraluminal filling defect is identified in the main, lobar, or segmental pulmonary arteries to suggest acute pulmonary thromboembolism. There is mild narrowing of right upper segmental pulmonary arteries due to compressionby adjacent bulky hilar lymphadenopathy. The aortic root and arch are normal in caliber. There are mild calcifications of the aortic root/valve, and irregular, ulcerative plaque involving the descending thoracic and upper abdominal aorta. A right IJ central line terminates in the lower SVC.    HEART: Heart size is normal. No pericardial effusion.    CHEST WALL AND LOWER NECK: Within normal limits.    VISUALIZED UPPER ABDOMEN: Trace ascites. Patchy areas of hepatic hyperattenuation may represent perfusion related changes. Dense calcifications in the periportal region presumably relate to granulomatous lymph nodes. There are findings of peritoneal carcinomatosis with stranding in the omentum, peritoneal thickening/enhancement, and multiple enhancing peritoneal soft tissue nodules. Multiple prominent gastrohepatic and retroperitoneal lymph nodes are also seen, likely metastatic. There is nonspecific thickening and hyperenhancement of the adrenal glands. There is a too small to characterize right renal hypodensity, potentially a cyst.    BONES: There is diffuse osseous metastatic disease, with involvement of the T1 and T6 vertebral bodies, including mild T6 pathologic wedge compression fracture with evidence of ventral epidural disease resulting in at least mild central spinal canal stenosis. There is lytic osseous destruction of the right posterior seventh and left posterior 11th ribs with associated soft tissue components. Additional lytic metastases are seen involving the sternal body and left scapula.    IMPRESSION:  Constellation of imaging findings compatible with malignant neoplasm (? bronchogenic or gastrointestinal carcinoma, less likely primary pleural malignancy) as characterized by masslike pleural thickening and pleural-based masses, primarily on the left, large left pleural effusion in association with complete left lung atelectasis/collapse, numerous right lung nodules and nodular opacities, as well as right hilar and mediastinal lymphadenopathy. Additional findings of osseous metastases, with lytic, destructive right posterior seventh and left posterior 11th rib lesions, as well as lytic involvement of the T1 and T6 vertebral bodies, including evidence of ventral epidural disease at T6 resulting in some degree of spinal canal stenosis. Further workup with contrast-enhanced MRI of the total spine is recommended to evaluate for neural element integrity.    Patchy right lung groundglass opacities, septal thickening, and vascular engorgement may be related to superimposed pneumonia, with special consideration to atypical etiology such as COVID-19.    No evidence of acute pulmonary thromboembolism.    Limited evaluation of the upper abdomen reveals findings concerning for peritoneal carcinomatosis, for which further workup with contrast-enhanced CT abdomen and pelvis is recommended.    < end of copied text >   Thoracic Surgery Consultation Note    Patient is a 75y old  Female who presents with a chief complaint of respiratory failure (15 Dec 2020 20:16)      HPI:  74yo female with DM, DVT, Uterine cancer (s/p PA, RTx5, in remission since 2016), anxiety, presented to ER for AMS. Patient in ER was noted to be in respiratory distress with hypercapnia on labs for which she was intubated. As per daughter, patient was diagnosed with DVT in  for which she has been on Eliquis. She later had a fracture of femur in same limb (?pathological fracture), requiring placement of mer. Patient underwent biopsy of bone at that time which was negative for malignancy however remained high suspicion. She had increasing pain, and was diagnosed with fracture of coccyx requiring cement repair following which she was transferred to rehab where she progressively deteriorated. Moreover her o2 sats were noted dropping. She was discharged from rehab on 5l NC about 4 days prior . As per daughter cause was not known. She reports having the patient's scans rechecked with PCP who noted pulmonary nodules and recommended lung biopsy. Patient was discharged to home on friday. She has been complaining of difficulty breathing with a noted cough productive of phlegm. Her breathing was getting labored with increasing disorientation. Patient last night kept removing her NC, found this AM to be lethargic with her o2 off, was disoriented and incoherent hence sent to ER. No reported episodes of fever.  (15 Dec 2020 20:16)    INTERVAL HPI:  History as above. Pt intubated and sedated. Thoracic Surgery consulted for large left pleural effusion and findings of known left lung nodules suspicious for carcinoma. Pt currently on bid lovenox for prior history of DVT.     PAST MEDICAL & SURGICAL HISTORY:  Anxiety    DVT, lower extremity    Uterine cancer    DM (diabetes mellitus)    S/P PA-BSO        Allergies    No Known Drug Allergies  raspberries (Angioedema)    MEDICATIONS  (STANDING):  cefepime   IVPB 2000 milliGRAM(s) IV Intermittent every 8 hours  chlorhexidine 0.12% Liquid 15 milliLiter(s) Oral Mucosa every 12 hours  chlorhexidine 2% Cloths 1 Application(s) Topical <User Schedule>  enoxaparin Injectable 90 milliGRAM(s) SubCutaneous two times a day  fentaNYL   Infusion 2 MICROgram(s)/kG/Hr (17.8 mL/Hr) IV Continuous <Continuous>  insulin lispro (ADMELOG) corrective regimen sliding scale   SubCutaneous every 6 hours  norepinephrine Infusion 0.1 MICROgram(s)/kG/Min (8.36 mL/Hr) IV Continuous <Continuous>  pantoprazole  Injectable 40 milliGRAM(s) IV Push daily  phenylephrine    Infusion 7 MICROgram(s)/kG/Min (117 mL/Hr) IV Continuous <Continuous>  polyethylene glycol 3350 17 Gram(s) Oral daily  vasopressin Infusion 0.04 Unit(s)/Min (2.4 mL/Hr) IV Continuous <Continuous>    MEDICATIONS  (PRN):  sodium chloride 0.9% lock flush 10 milliLiter(s) IV Push every 1 hour PRN Pre/post blood products, medications, blood draw, and to maintain line patency      Vital Signs Last 24 Hrs  T(C): 37.8 (16 Dec 2020 09:15), Max: 38.1 (16 Dec 2020 08:00)  T(F): 100 (16 Dec 2020 09:15), Max: 100.5 (16 Dec 2020 08:00)  HR: 115 (16 Dec 2020 09:30) (84 - 132)  BP: 131/71 (16 Dec 2020 09:30) (80/49 - 154/63)  BP(mean): 86 (16 Dec 2020 09:30) (55 - 87)  RR: 11 (16 Dec 2020 09:30) (11 - 23)  SpO2: 98% (16 Dec 2020 09:15) (95% - 100%)    Physical Exam:  Gen: intubated, 3 pressors,  SBP at bedside 90s. PEEP 5, FiO2 70%  HEENT: normocephalic, neck soft and supple  Chest: equal chest rise  Abd: soft not distended    Labs:                          9.7    12.46 )-----------( 257      ( 16 Dec 2020 05:11 )             32.4     12-16    135  |  100  |  34<H>  ----------------------------<  93  5.0   |  22  |  0.58    Ca    9.5      16 Dec 2020 05:11  Phos  0.7     12-16  Mg     1.9     12-16    TPro  7.3  /  Alb  2.2<L>  /  TBili  0.4  /  DBili  x   /  AST  37  /  ALT  17  /  AlkPhos  91  12-16    PT/INR - ( 15 Dec 2020 16:02 )   PT: 23.3 sec;   INR: 2.02 ratio         PTT - ( 15 Dec 2020 16:02 )  PTT:26.1 sec  Urinalysis Basic - ( 15 Dec 2020 16:03 )    Color: Yellow / Appearance: Clear / S.020 / pH: x  Gluc: x / Ketone: Small  / Bili: Negative / Urobili: 1   Blood: x / Protein: 30 mg/dL / Nitrite: Negative   Leuk Esterase: Trace / RBC: 10-25 /HPF / WBC 6-10 /HPF   Sq Epi: x / Non Sq Epi: Few /HPF / Bacteria: Few /HPF          Radiological Exams:  c< from: CT Angio Chest w/ IV Cont (12.15.20 @ 22:15) >  EXAM:  CT ANGIO CHEST (W)AW IC                            PROCEDURE DATE:  12/15/2020          INTERPRETATION:  CLINICAL INFORMATION: Respiratory failure.    COMPARISON: None.    PROCEDURE:  CT Angiography of the Chest.  90 ml of Omnipaque 350 was injected intravenously. 10 ml were discarded.  Sagittal and coronal reformats were performed as well as 3D (MIP) reconstructions.    FINDINGS:    LUNGS, AIRWAYS, PLEURA: An endotracheal tube is in satisfactory position. There is mucoid impaction of the left mainstem bronchus and complete left lung atelectasis/collapse in the setting of a large left pleural effusion. There is mucoid impaction of right lower lobe superior segmental bronchi. There is masslike left pleural enhancement and numerous enhancing pleural nodules, likely metastases. There is a small right pleural effusion with adjacent passive atelectasis, partially loculated along the right major fissure, with additional smaller enhancing pleural metastases. There is diffuse right bronchial wall thickening, including scattered lung nodules and nodular opacities, such as along the right major fissure measuring 2 cm on 6:106 and in the right lower lobe on 6:90 measuring 1.6 cm, concerning for metastases. There are superimposed patchy right lung groundglass opacities, septal thickening, and vascular dilatation, especially pronounced in the right upper lobe, concerning for pneumonia.    MEDIASTINUM AND YUE: There is mediastinal and bulky right hilar lymphadenopathy, with subcarinallymphadenopathy measuring 2.1 cm in short axis diameter. Calcifications in association with mediastinal nodes is likely due to old granulomatous disease. There are several mildly prominent cardiophrenic lymph nodes measuring up to 0.8 cm in short axis diameter.    VESSELS: No intraluminal filling defect is identified in the main, lobar, or segmental pulmonary arteries to suggest acute pulmonary thromboembolism. There is mild narrowing of right upper segmental pulmonary arteries due to compressionby adjacent bulky hilar lymphadenopathy. The aortic root and arch are normal in caliber. There are mild calcifications of the aortic root/valve, and irregular, ulcerative plaque involving the descending thoracic and upper abdominal aorta. A right IJ central line terminates in the lower SVC.    HEART: Heart size is normal. No pericardial effusion.    CHEST WALL AND LOWER NECK: Within normal limits.    VISUALIZED UPPER ABDOMEN: Trace ascites. Patchy areas of hepatic hyperattenuation may represent perfusion related changes. Dense calcifications in the periportal region presumably relate to granulomatous lymph nodes. There are findings of peritoneal carcinomatosis with stranding in the omentum, peritoneal thickening/enhancement, and multiple enhancing peritoneal soft tissue nodules. Multiple prominent gastrohepatic and retroperitoneal lymph nodes are also seen, likely metastatic. There is nonspecific thickening and hyperenhancement of the adrenal glands. There is a too small to characterize right renal hypodensity, potentially a cyst.    BONES: There is diffuse osseous metastatic disease, with involvement of the T1 and T6 vertebral bodies, including mild T6 pathologic wedge compression fracture with evidence of ventral epidural disease resulting in at least mild central spinal canal stenosis. There is lytic osseous destruction of the right posterior seventh and left posterior 11th ribs with associated soft tissue components. Additional lytic metastases are seen involving the sternal body and left scapula.    IMPRESSION:  Constellation of imaging findings compatible with malignant neoplasm (? bronchogenic or gastrointestinal carcinoma, less likely primary pleural malignancy) as characterized by masslike pleural thickening and pleural-based masses, primarily on the left, large left pleural effusion in association with complete left lung atelectasis/collapse, numerous right lung nodules and nodular opacities, as well as right hilar and mediastinal lymphadenopathy. Additional findings of osseous metastases, with lytic, destructive right posterior seventh and left posterior 11th rib lesions, as well as lytic involvement of the T1 and T6 vertebral bodies, including evidence of ventral epidural disease at T6 resulting in some degree of spinal canal stenosis. Further workup with contrast-enhanced MRI of the total spine is recommended to evaluate for neural element integrity.    Patchy right lung groundglass opacities, septal thickening, and vascular engorgement may be related to superimposed pneumonia, with special consideration to atypical etiology such as COVID-19.    No evidence of acute pulmonary thromboembolism.    Limited evaluation of the upper abdomen reveals findings concerning for peritoneal carcinomatosis, for which further workup with contrast-enhanced CT abdomen and pelvis is recommended.    < end of copied text >

## 2020-12-17 DIAGNOSIS — C79.9 SECONDARY MALIGNANT NEOPLASM OF UNSPECIFIED SITE: ICD-10-CM

## 2020-12-17 LAB
ALBUMIN SERPL ELPH-MCNC: 1.8 G/DL — LOW (ref 3.5–5)
ALP SERPL-CCNC: 168 U/L — HIGH (ref 40–120)
ALT FLD-CCNC: 1692 U/L DA — HIGH (ref 10–60)
ANION GAP SERPL CALC-SCNC: 15 MMOL/L — SIGNIFICANT CHANGE UP (ref 5–17)
ANION GAP SERPL CALC-SCNC: 15 MMOL/L — SIGNIFICANT CHANGE UP (ref 5–17)
AST SERPL-CCNC: 3311 U/L — HIGH (ref 10–40)
BASOPHILS # BLD AUTO: 0.01 K/UL — SIGNIFICANT CHANGE UP (ref 0–0.2)
BASOPHILS NFR BLD AUTO: 0.1 % — SIGNIFICANT CHANGE UP (ref 0–2)
BILIRUB SERPL-MCNC: 0.5 MG/DL — SIGNIFICANT CHANGE UP (ref 0.2–1.2)
BUN SERPL-MCNC: 42 MG/DL — HIGH (ref 7–18)
BUN SERPL-MCNC: 44 MG/DL — HIGH (ref 7–18)
CALCIUM SERPL-MCNC: 7.8 MG/DL — LOW (ref 8.4–10.5)
CALCIUM SERPL-MCNC: 8.2 MG/DL — LOW (ref 8.4–10.5)
CHLORIDE SERPL-SCNC: 107 MMOL/L — SIGNIFICANT CHANGE UP (ref 96–108)
CHLORIDE SERPL-SCNC: 109 MMOL/L — HIGH (ref 96–108)
CO2 SERPL-SCNC: 14 MMOL/L — LOW (ref 22–31)
CO2 SERPL-SCNC: 14 MMOL/L — LOW (ref 22–31)
CREAT SERPL-MCNC: 1.46 MG/DL — HIGH (ref 0.5–1.3)
CREAT SERPL-MCNC: 1.63 MG/DL — HIGH (ref 0.5–1.3)
EOSINOPHIL # BLD AUTO: 0 K/UL — SIGNIFICANT CHANGE UP (ref 0–0.5)
EOSINOPHIL NFR BLD AUTO: 0 % — SIGNIFICANT CHANGE UP (ref 0–6)
GLUCOSE SERPL-MCNC: 179 MG/DL — HIGH (ref 70–99)
GLUCOSE SERPL-MCNC: 99 MG/DL — SIGNIFICANT CHANGE UP (ref 70–99)
HCT VFR BLD CALC: 34.3 % — LOW (ref 34.5–45)
HGB BLD-MCNC: 10 G/DL — LOW (ref 11.5–15.5)
IMM GRANULOCYTES NFR BLD AUTO: 0.7 % — SIGNIFICANT CHANGE UP (ref 0–1.5)
INR BLD: 3.32 RATIO — HIGH (ref 0.88–1.16)
LACTATE SERPL-SCNC: 9.9 MMOL/L — CRITICAL HIGH (ref 0.7–2)
LYMPHOCYTES # BLD AUTO: 0.53 K/UL — LOW (ref 1–3.3)
LYMPHOCYTES # BLD AUTO: 3.6 % — LOW (ref 13–44)
MAGNESIUM SERPL-MCNC: 2 MG/DL — SIGNIFICANT CHANGE UP (ref 1.6–2.6)
MCHC RBC-ENTMCNC: 29.2 GM/DL — LOW (ref 32–36)
MCHC RBC-ENTMCNC: 29.4 PG — SIGNIFICANT CHANGE UP (ref 27–34)
MCV RBC AUTO: 100.9 FL — HIGH (ref 80–100)
MONOCYTES # BLD AUTO: 0.59 K/UL — SIGNIFICANT CHANGE UP (ref 0–0.9)
MONOCYTES NFR BLD AUTO: 4 % — SIGNIFICANT CHANGE UP (ref 2–14)
NEUTROPHILS # BLD AUTO: 13.34 K/UL — HIGH (ref 1.8–7.4)
NEUTROPHILS NFR BLD AUTO: 91.6 % — HIGH (ref 43–77)
NRBC # BLD: 0 /100 WBCS — SIGNIFICANT CHANGE UP (ref 0–0)
PHOSPHATE SERPL-MCNC: 6.8 MG/DL — HIGH (ref 2.5–4.5)
PLATELET # BLD AUTO: 218 K/UL — SIGNIFICANT CHANGE UP (ref 150–400)
POTASSIUM SERPL-MCNC: 6 MMOL/L — HIGH (ref 3.5–5.3)
POTASSIUM SERPL-MCNC: 6.5 MMOL/L — CRITICAL HIGH (ref 3.5–5.3)
POTASSIUM SERPL-SCNC: 6 MMOL/L — HIGH (ref 3.5–5.3)
POTASSIUM SERPL-SCNC: 6.5 MMOL/L — CRITICAL HIGH (ref 3.5–5.3)
PROT SERPL-MCNC: 6.6 G/DL — SIGNIFICANT CHANGE UP (ref 6–8.3)
PROTHROM AB SERPL-ACNC: 37.3 SEC — HIGH (ref 10.6–13.6)
RBC # BLD: 3.4 M/UL — LOW (ref 3.8–5.2)
RBC # FLD: 14.3 % — SIGNIFICANT CHANGE UP (ref 10.3–14.5)
SARS-COV-2 IGG SERPL QL IA: NEGATIVE — SIGNIFICANT CHANGE UP
SARS-COV-2 IGM SERPL IA-ACNC: <0.1 INDEX — SIGNIFICANT CHANGE UP
SODIUM SERPL-SCNC: 136 MMOL/L — SIGNIFICANT CHANGE UP (ref 135–145)
SODIUM SERPL-SCNC: 138 MMOL/L — SIGNIFICANT CHANGE UP (ref 135–145)
WBC # BLD: 14.57 K/UL — HIGH (ref 3.8–10.5)
WBC # FLD AUTO: 14.57 K/UL — HIGH (ref 3.8–10.5)

## 2020-12-17 PROCEDURE — 93010 ELECTROCARDIOGRAM REPORT: CPT | Mod: 76

## 2020-12-17 PROCEDURE — 71045 X-RAY EXAM CHEST 1 VIEW: CPT | Mod: 26

## 2020-12-17 PROCEDURE — 99233 SBSQ HOSP IP/OBS HIGH 50: CPT

## 2020-12-17 PROCEDURE — 99232 SBSQ HOSP IP/OBS MODERATE 35: CPT | Mod: 57

## 2020-12-17 RX ORDER — INSULIN HUMAN 100 [IU]/ML
5 INJECTION, SOLUTION SUBCUTANEOUS ONCE
Refills: 0 | Status: DISCONTINUED | OUTPATIENT
Start: 2020-12-17 | End: 2020-12-17

## 2020-12-17 RX ORDER — SODIUM ZIRCONIUM CYCLOSILICATE 10 G/10G
10 POWDER, FOR SUSPENSION ORAL ONCE
Refills: 0 | Status: DISCONTINUED | OUTPATIENT
Start: 2020-12-17 | End: 2020-12-17

## 2020-12-17 RX ORDER — SODIUM ZIRCONIUM CYCLOSILICATE 10 G/10G
10 POWDER, FOR SUSPENSION ORAL ONCE
Refills: 0 | Status: COMPLETED | OUTPATIENT
Start: 2020-12-17 | End: 2020-12-17

## 2020-12-17 RX ORDER — INSULIN HUMAN 100 [IU]/ML
5 INJECTION, SOLUTION SUBCUTANEOUS ONCE
Refills: 0 | Status: COMPLETED | OUTPATIENT
Start: 2020-12-17 | End: 2020-12-17

## 2020-12-17 RX ORDER — INSULIN HUMAN 100 [IU]/ML
10 INJECTION, SOLUTION SUBCUTANEOUS ONCE
Refills: 0 | Status: COMPLETED | OUTPATIENT
Start: 2020-12-17 | End: 2020-12-17

## 2020-12-17 RX ORDER — CALCIUM GLUCONATE 100 MG/ML
1 VIAL (ML) INTRAVENOUS ONCE
Refills: 0 | Status: COMPLETED | OUTPATIENT
Start: 2020-12-17 | End: 2020-12-17

## 2020-12-17 RX ORDER — NOREPINEPHRINE BITARTRATE/D5W 8 MG/250ML
0.35 PLASTIC BAG, INJECTION (ML) INTRAVENOUS
Qty: 16 | Refills: 0 | Status: DISCONTINUED | OUTPATIENT
Start: 2020-12-16 | End: 2020-12-18

## 2020-12-17 RX ORDER — INSULIN HUMAN 100 [IU]/ML
10 INJECTION, SOLUTION SUBCUTANEOUS ONCE
Refills: 0 | Status: DISCONTINUED | OUTPATIENT
Start: 2020-12-17 | End: 2020-12-17

## 2020-12-17 RX ORDER — DEXTROSE 50 % IN WATER 50 %
100 SYRINGE (ML) INTRAVENOUS ONCE
Refills: 0 | Status: COMPLETED | OUTPATIENT
Start: 2020-12-17 | End: 2020-12-17

## 2020-12-17 RX ORDER — DEXTROSE 50 % IN WATER 50 %
50 SYRINGE (ML) INTRAVENOUS ONCE
Refills: 0 | Status: COMPLETED | OUTPATIENT
Start: 2020-12-17 | End: 2020-12-17

## 2020-12-17 RX ADMIN — CEFEPIME 100 MILLIGRAM(S): 1 INJECTION, POWDER, FOR SOLUTION INTRAMUSCULAR; INTRAVENOUS at 05:22

## 2020-12-17 RX ADMIN — CHLORHEXIDINE GLUCONATE 15 MILLILITER(S): 213 SOLUTION TOPICAL at 05:22

## 2020-12-17 RX ADMIN — Medication 29.3 MICROGRAM(S)/KG/MIN: at 15:30

## 2020-12-17 RX ADMIN — INSULIN HUMAN 10 UNIT(S): 100 INJECTION, SOLUTION SUBCUTANEOUS at 11:48

## 2020-12-17 RX ADMIN — Medication 8.36 MICROGRAM(S)/KG/MIN: at 00:44

## 2020-12-17 RX ADMIN — PHENYLEPHRINE HYDROCHLORIDE 167 MICROGRAM(S)/KG/MIN: 10 INJECTION INTRAVENOUS at 11:00

## 2020-12-17 RX ADMIN — CHLORHEXIDINE GLUCONATE 15 MILLILITER(S): 213 SOLUTION TOPICAL at 18:25

## 2020-12-17 RX ADMIN — Medication 1: at 11:15

## 2020-12-17 RX ADMIN — CEFEPIME 100 MILLIGRAM(S): 1 INJECTION, POWDER, FOR SOLUTION INTRAMUSCULAR; INTRAVENOUS at 21:30

## 2020-12-17 RX ADMIN — SODIUM ZIRCONIUM CYCLOSILICATE 10 GRAM(S): 10 POWDER, FOR SUSPENSION ORAL at 06:54

## 2020-12-17 RX ADMIN — CEFEPIME 100 MILLIGRAM(S): 1 INJECTION, POWDER, FOR SOLUTION INTRAMUSCULAR; INTRAVENOUS at 13:05

## 2020-12-17 RX ADMIN — Medication 29.3 MICROGRAM(S)/KG/MIN: at 09:45

## 2020-12-17 RX ADMIN — SODIUM ZIRCONIUM CYCLOSILICATE 10 GRAM(S): 10 POWDER, FOR SUSPENSION ORAL at 11:54

## 2020-12-17 RX ADMIN — PHENYLEPHRINE HYDROCHLORIDE 167 MICROGRAM(S)/KG/MIN: 10 INJECTION INTRAVENOUS at 14:00

## 2020-12-17 RX ADMIN — VASOPRESSIN 2.4 UNIT(S)/MIN: 20 INJECTION INTRAVENOUS at 18:29

## 2020-12-17 RX ADMIN — Medication 100 GRAM(S): at 12:30

## 2020-12-17 RX ADMIN — FENTANYL CITRATE 17.8 MICROGRAM(S)/KG/HR: 50 INJECTION INTRAVENOUS at 05:22

## 2020-12-17 RX ADMIN — PHENYLEPHRINE HYDROCHLORIDE 167 MICROGRAM(S)/KG/MIN: 10 INJECTION INTRAVENOUS at 09:00

## 2020-12-17 RX ADMIN — Medication 50 MILLILITER(S): at 11:48

## 2020-12-17 RX ADMIN — PHENYLEPHRINE HYDROCHLORIDE 167 MICROGRAM(S)/KG/MIN: 10 INJECTION INTRAVENOUS at 04:27

## 2020-12-17 RX ADMIN — PHENYLEPHRINE HYDROCHLORIDE 167 MICROGRAM(S)/KG/MIN: 10 INJECTION INTRAVENOUS at 18:29

## 2020-12-17 RX ADMIN — PHENYLEPHRINE HYDROCHLORIDE 167 MICROGRAM(S)/KG/MIN: 10 INJECTION INTRAVENOUS at 01:33

## 2020-12-17 RX ADMIN — POLYETHYLENE GLYCOL 3350 17 GRAM(S): 17 POWDER, FOR SOLUTION ORAL at 11:15

## 2020-12-17 RX ADMIN — Medication 100 GRAM(S): at 06:54

## 2020-12-17 RX ADMIN — Medication 29.3 MICROGRAM(S)/KG/MIN: at 21:33

## 2020-12-17 RX ADMIN — INSULIN HUMAN 5 UNIT(S): 100 INJECTION, SOLUTION SUBCUTANEOUS at 06:54

## 2020-12-17 RX ADMIN — CHLORHEXIDINE GLUCONATE 1 APPLICATION(S): 213 SOLUTION TOPICAL at 05:22

## 2020-12-17 RX ADMIN — Medication 100 MILLILITER(S): at 06:56

## 2020-12-17 RX ADMIN — VASOPRESSIN 2.4 UNIT(S)/MIN: 20 INJECTION INTRAVENOUS at 04:27

## 2020-12-17 RX ADMIN — PANTOPRAZOLE SODIUM 40 MILLIGRAM(S): 20 TABLET, DELAYED RELEASE ORAL at 11:49

## 2020-12-17 NOTE — PROGRESS NOTE ADULT - SUBJECTIVE AND OBJECTIVE BOX
INTERVAL HPI/OVERNIGHT EVENTS: ***    PRESSORS: [ ] YES [ ] NO  WHICH:    ANTIBIOTICS:                  DATE STARTED:  ANTIBIOTICS:                  DATE STARTED:  ANTIBIOTICS:                  DATE STARTED:    Antimicrobial:  cefepime   IVPB 2000 milliGRAM(s) IV Intermittent every 8 hours    Cardiovascular:  norepinephrine Infusion 0.35 MICROgram(s)/kG/Min IV Continuous <Continuous>  phenylephrine    Infusion 10 MICROgram(s)/kG/Min IV Continuous <Continuous>    Pulmonary:    Hematalogic:    Other:  chlorhexidine 0.12% Liquid 15 milliLiter(s) Oral Mucosa every 12 hours  chlorhexidine 2% Cloths 1 Application(s) Topical <User Schedule>  fentaNYL   Infusion 2 MICROgram(s)/kG/Hr IV Continuous <Continuous>  insulin lispro (ADMELOG) corrective regimen sliding scale   SubCutaneous every 6 hours  pantoprazole   Suspension 40 milliGRAM(s) Enteral Tube daily  polyethylene glycol 3350 17 Gram(s) Oral daily  sodium chloride 0.9% lock flush 10 milliLiter(s) IV Push every 1 hour PRN  vasopressin Infusion 0.04 Unit(s)/Min IV Continuous <Continuous>    cefepime   IVPB 2000 milliGRAM(s) IV Intermittent every 8 hours  chlorhexidine 0.12% Liquid 15 milliLiter(s) Oral Mucosa every 12 hours  chlorhexidine 2% Cloths 1 Application(s) Topical <User Schedule>  fentaNYL   Infusion 2 MICROgram(s)/kG/Hr IV Continuous <Continuous>  insulin lispro (ADMELOG) corrective regimen sliding scale   SubCutaneous every 6 hours  norepinephrine Infusion 0.35 MICROgram(s)/kG/Min IV Continuous <Continuous>  pantoprazole   Suspension 40 milliGRAM(s) Enteral Tube daily  phenylephrine    Infusion 10 MICROgram(s)/kG/Min IV Continuous <Continuous>  polyethylene glycol 3350 17 Gram(s) Oral daily  sodium chloride 0.9% lock flush 10 milliLiter(s) IV Push every 1 hour PRN  vasopressin Infusion 0.04 Unit(s)/Min IV Continuous <Continuous>    Drug Dosing Weight  Height (cm): 154.9 (15 Dec 2020 14:42)  Weight (kg): 89.2 (15 Dec 2020 22:30)  BMI (kg/m2): 37.2 (15 Dec 2020 22:30)  BSA (m2): 1.88 (15 Dec 2020 22:30)    CENTRAL LINE: [ ] YES [ ] NO  LOCATION:   DATE INSERTED:    NICOLE: [ ] YES [ ] NO    DATE INSERTED:    A-LINE:  [ ] YES [ ] NO  LOCATION:   DATE INSERTED:    ICU Vital Signs Last 24 Hrs  T(C): 37.8 (17 Dec 2020 07:30), Max: 38.4 (16 Dec 2020 20:00)  T(F): 100 (17 Dec 2020 07:30), Max: 101.1 (16 Dec 2020 20:00)  HR: 137 (17 Dec 2020 10:00) (100 - 145)  BP: 82/56 (17 Dec 2020 10:00) (59/28 - 130/100)  BP(mean): 62 (17 Dec 2020 10:00) (34 - 115)  ABP: --  ABP(mean): --  RR: 20 (17 Dec 2020 10:00) (10 - 27)  SpO2: 92% (17 Dec 2020 08:20) (88% - 97%)      ABG - ( 16 Dec 2020 04:09 )  pH, Arterial: 7.42  pH, Blood: x     /  pCO2: 35    /  pO2: 103   / HCO3: 22    / Base Excess: -1.5  /  SaO2: 98                     @ 07:01  -   @ 07:00  --------------------------------------------------------  IN: 4875.5 mL / OUT: 570 mL / NET: 4305.5 mL        Mode: AC/ CMV (Assist Control/ Continuous Mandatory Ventilation)  RR (machine): 20  TV (machine): 450  FiO2: 100  PEEP: 5  ITime: 1  MAP: 17  PIP: 40      REVIEW OF SYSTEMS:    CONSTITUTIONAL: No weakness, fevers or chills  NECK: No pain or stiffnes  RESPIRATORY: ++ cough, +wheezing, + dyspnea,   CARDIOVASCULAR: No chest pain or palpitations  GASTROINTESTINAL: No abdominal or epigastric pain. No nausea, vomiting, No diarrhea or constipation. No melena or hematochezia.  GENITOURINARY: No dysuria, frequency or hematuria  NEUROLOGICAL: No numbness or weakness  All other review of systems is negative unless indicated above    PHYSICAL EXAM:    >>> <<<    LABS:  CBC Full  -  ( 17 Dec 2020 06:05 )  WBC Count : 14.57 K/uL  RBC Count : 3.40 M/uL  Hemoglobin : 10.0 g/dL  Hematocrit : 34.3 %  Platelet Count - Automated : 218 K/uL  Mean Cell Volume : 100.9 fl  Mean Cell Hemoglobin : 29.4 pg  Mean Cell Hemoglobin Concentration : 29.2 gm/dL  Auto Neutrophil # : 13.34 K/uL  Auto Lymphocyte # : 0.53 K/uL  Auto Monocyte # : 0.59 K/uL  Auto Eosinophil # : 0.00 K/uL  Auto Basophil # : 0.01 K/uL  Auto Neutrophil % : 91.6 %  Auto Lymphocyte % : 3.6 %  Auto Monocyte % : 4.0 %  Auto Eosinophil % : 0.0 %  Auto Basophil % : 0.1 %        136  |  107  |  42<H>  ----------------------------<  99  6.5<HH>   |  14<L>  |  1.46<H>    Ca    8.2<L>      17 Dec 2020 06:05  Phos  6.8       Mg     2.0         TPro  6.6  /  Alb  1.8<L>  /  TBili  0.5  /  DBili  x   /  AST  3311<H>  /  ALT  1692<H>  /  AlkPhos  168<H>      PT/INR - ( 17 Dec 2020 06:05 )   PT: 37.3 sec;   INR: 3.32 ratio         PTT - ( 15 Dec 2020 16:02 )  PTT:26.1 sec  Urinalysis Basic - ( 15 Dec 2020 16:03 )    Color: Yellow / Appearance: Clear / S.020 / pH: x  Gluc: x / Ketone: Small  / Bili: Negative / Urobili: 1   Blood: x / Protein: 30 mg/dL / Nitrite: Negative   Leuk Esterase: Trace / RBC: 10-25 /HPF / WBC 6-10 /HPF   Sq Epi: x / Non Sq Epi: Few /HPF / Bacteria: Few /HPF      Culture Results:   No growth (12-15 @ 22:09)  Culture Results:   No growth to date. (12-15 @ 22:08)  Culture Results:   No growth to date. (12-15 @ 22:08)      RADIOLOGY & ADDITIONAL STUDIES REVIEWED:  ***    [ ]GOALS OF CARE DISCUSSION WITH PATIENT/FAMILY/PROXY:    CRITICAL CARE TIME SPENT: 35 minutes INTERVAL HPI/OVERNIGHT EVENTS: pt on 3 pressors, continues to be hypotensive. in liver shock     PRESSORS: [s ] YES [ ] NO  WHICH: levo, pheny, vaso    ANTIBIOTICS:        x          DATE STARTED:     Antimicrobial:  cefepime   IVPB 2000 milliGRAM(s) IV Intermittent every 8 hours    Cardiovascular:  norepinephrine Infusion 0.35 MICROgram(s)/kG/Min IV Continuous <Continuous>  phenylephrine    Infusion 10 MICROgram(s)/kG/Min IV Continuous <Continuous>    Pulmonary:    Hematalogic:    Other:  chlorhexidine 0.12% Liquid 15 milliLiter(s) Oral Mucosa every 12 hours  chlorhexidine 2% Cloths 1 Application(s) Topical <User Schedule>  fentaNYL   Infusion 2 MICROgram(s)/kG/Hr IV Continuous <Continuous>  insulin lispro (ADMELOG) corrective regimen sliding scale   SubCutaneous every 6 hours  pantoprazole   Suspension 40 milliGRAM(s) Enteral Tube daily  polyethylene glycol 3350 17 Gram(s) Oral daily  sodium chloride 0.9% lock flush 10 milliLiter(s) IV Push every 1 hour PRN  vasopressin Infusion 0.04 Unit(s)/Min IV Continuous <Continuous>    cefepime   IVPB 2000 milliGRAM(s) IV Intermittent every 8 hours  chlorhexidine 0.12% Liquid 15 milliLiter(s) Oral Mucosa every 12 hours  chlorhexidine 2% Cloths 1 Application(s) Topical <User Schedule>  fentaNYL   Infusion 2 MICROgram(s)/kG/Hr IV Continuous <Continuous>  insulin lispro (ADMELOG) corrective regimen sliding scale   SubCutaneous every 6 hours  norepinephrine Infusion 0.35 MICROgram(s)/kG/Min IV Continuous <Continuous>  pantoprazole   Suspension 40 milliGRAM(s) Enteral Tube daily  phenylephrine    Infusion 10 MICROgram(s)/kG/Min IV Continuous <Continuous>  polyethylene glycol 3350 17 Gram(s) Oral daily  sodium chloride 0.9% lock flush 10 milliLiter(s) IV Push every 1 hour PRN  vasopressin Infusion 0.04 Unit(s)/Min IV Continuous <Continuous>    Drug Dosing Weight  Height (cm): 154.9 (15 Dec 2020 14:42)  Weight (kg): 89.2 (15 Dec 2020 22:30)  BMI (kg/m2): 37.2 (15 Dec 2020 22:30)  BSA (m2): 1.88 (15 Dec 2020 22:30)    CENTRAL LINE: [ x] YES [ ] NO  LOCATION:   [facility]/15    NICOLE: [ x] YES [ ] NO    DATE INSERTED:    A-LINE:  [ ] YES [ ] NO  LOCATION:   DATE INSERTED:    ICU Vital Signs Last 24 Hrs  T(C): 37.8 (17 Dec 2020 07:30), Max: 38.4 (16 Dec 2020 20:00)  T(F): 100 (17 Dec 2020 07:30), Max: 101.1 (16 Dec 2020 20:00)  HR: 137 (17 Dec 2020 10:00) (100 - 145)  BP: 82/56 (17 Dec 2020 10:00) (59/28 - 130/100)  BP(mean): 62 (17 Dec 2020 10:00) (34 - 115)  ABP: --  ABP(mean): --  RR: 20 (17 Dec 2020 10:00) (10 - 27)  SpO2: 92% (17 Dec 2020 08:20) (88% - 97%)      ABG - ( 16 Dec 2020 04:09 )  pH, Arterial: 7.42  pH, Blood: x     /  pCO2: 35    /  pO2: 103   / HCO3: 22    / Base Excess: -1.5  /  SaO2: 98                     @ 07:01  -   @ 07:00  --------------------------------------------------------  IN: 4875.5 mL / OUT: 570 mL / NET: 4305.5 mL        Mode: AC/ CMV (Assist Control/ Continuous Mandatory Ventilation)  RR (machine): 20  TV (machine): 450  FiO2: 100  PEEP: 5  ITime: 1  MAP: 17  PIP: 40      REVIEW OF SYSTEMS:    unable to obtain         PHYSICAL EXAM:  GENERAL: sedated and intubated   HEAD:  Atraumatic, Normocephalic  EYES: ENT: dry mucous membranes  NECK: Supple, No JVD  CHEST/LUNG: distant bs on L SIDE. rhonchi heard bl   HEART: Regular rate and rhythm; S1+ S2+  ABDOMEN: Bowel sounds present; Soft, Nontender, Nondistended.   EXTREMITIES:  2+ Peripheral Pulses, brisk capillary refill. cold extremities  NERVOUS SYSTEM: sedated and intubated   SKIN: bruising noted on arm     LABS:  CBC Full  -  ( 17 Dec 2020 06:05 )  WBC Count : 14.57 K/uL  RBC Count : 3.40 M/uL  Hemoglobin : 10.0 g/dL  Hematocrit : 34.3 %  Platelet Count - Automated : 218 K/uL  Mean Cell Volume : 100.9 fl  Mean Cell Hemoglobin : 29.4 pg  Mean Cell Hemoglobin Concentration : 29.2 gm/dL  Auto Neutrophil # : 13.34 K/uL  Auto Lymphocyte # : 0.53 K/uL  Auto Monocyte # : 0.59 K/uL  Auto Eosinophil # : 0.00 K/uL  Auto Basophil # : 0.01 K/uL  Auto Neutrophil % : 91.6 %  Auto Lymphocyte % : 3.6 %  Auto Monocyte % : 4.0 %  Auto Eosinophil % : 0.0 %  Auto Basophil % : 0.1 %        136  |  107  |  42<H>  ----------------------------<  99  6.5<HH>   |  14<L>  |  1.46<H>    Ca    8.2<L>      17 Dec 2020 06:05  Phos  6.8       Mg     2.0         TPro  6.6  /  Alb  1.8<L>  /  TBili  0.5  /  DBili  x   /  AST  3311<H>  /  ALT  1692<H>  /  AlkPhos  168<H>      PT/INR - ( 17 Dec 2020 06:05 )   PT: 37.3 sec;   INR: 3.32 ratio         PTT - ( 15 Dec 2020 16:02 )  PTT:26.1 sec  Urinalysis Basic - ( 15 Dec 2020 16:03 )    Color: Yellow / Appearance: Clear / S.020 / pH: x  Gluc: x / Ketone: Small  / Bili: Negative / Urobili: 1   Blood: x / Protein: 30 mg/dL / Nitrite: Negative   Leuk Esterase: Trace / RBC: 10-25 /HPF / WBC 6-10 /HPF   Sq Epi: x / Non Sq Epi: Few /HPF / Bacteria: Few /HPF      Culture Results:   No growth (12-15 @ 22:09)  Culture Results:   No growth to date. (12-15 @ 22:08)  Culture Results:   No growth to date. (12-15 @ 22:08)      RADIOLOGY & ADDITIONAL STUDIES REVIEWED:  ***    [ ]GOALS OF CARE DISCUSSION WITH PATIENT/FAMILY/PROXY: DNR, NO ESCALATION OF CARE    CRITICAL CARE TIME SPENT: 35 minutes INTERVAL HPI/OVERNIGHT EVENTS: pt on 3 pressors, continues to be hypotensive. in liver shock     PRESSORS: [s ] YES [ ] NO  WHICH: levo, pheny, vaso    ANTIBIOTICS:        x          DATE STARTED:     Antimicrobial:  cefepime   IVPB 2000 milliGRAM(s) IV Intermittent every 8 hours    Cardiovascular:  norepinephrine Infusion 0.35 MICROgram(s)/kG/Min IV Continuous <Continuous>  phenylephrine    Infusion 10 MICROgram(s)/kG/Min IV Continuous <Continuous>    Pulmonary:    Hematalogic:    Other:  chlorhexidine 0.12% Liquid 15 milliLiter(s) Oral Mucosa every 12 hours  chlorhexidine 2% Cloths 1 Application(s) Topical <User Schedule>  fentaNYL   Infusion 2 MICROgram(s)/kG/Hr IV Continuous <Continuous>  insulin lispro (ADMELOG) corrective regimen sliding scale   SubCutaneous every 6 hours  pantoprazole   Suspension 40 milliGRAM(s) Enteral Tube daily  polyethylene glycol 3350 17 Gram(s) Oral daily  sodium chloride 0.9% lock flush 10 milliLiter(s) IV Push every 1 hour PRN  vasopressin Infusion 0.04 Unit(s)/Min IV Continuous <Continuous>    cefepime   IVPB 2000 milliGRAM(s) IV Intermittent every 8 hours  chlorhexidine 0.12% Liquid 15 milliLiter(s) Oral Mucosa every 12 hours  chlorhexidine 2% Cloths 1 Application(s) Topical <User Schedule>  fentaNYL   Infusion 2 MICROgram(s)/kG/Hr IV Continuous <Continuous>  insulin lispro (ADMELOG) corrective regimen sliding scale   SubCutaneous every 6 hours  norepinephrine Infusion 0.35 MICROgram(s)/kG/Min IV Continuous <Continuous>  pantoprazole   Suspension 40 milliGRAM(s) Enteral Tube daily  phenylephrine    Infusion 10 MICROgram(s)/kG/Min IV Continuous <Continuous>  polyethylene glycol 3350 17 Gram(s) Oral daily  sodium chloride 0.9% lock flush 10 milliLiter(s) IV Push every 1 hour PRN  vasopressin Infusion 0.04 Unit(s)/Min IV Continuous <Continuous>    Drug Dosing Weight  Height (cm): 154.9 (15 Dec 2020 14:42)  Weight (kg): 89.2 (15 Dec 2020 22:30)  BMI (kg/m2): 37.2 (15 Dec 2020 22:30)  BSA (m2): 1.88 (15 Dec 2020 22:30)    CENTRAL LINE: [ x] YES [ ] NO  LOCATION:   [facility]/15    NICOLE: [ x] YES [ ] NO    DATE INSERTED:    A-LINE:  [ ] YES [ ] NO  LOCATION:   DATE INSERTED:    ICU Vital Signs Last 24 Hrs  T(C): 37.8 (17 Dec 2020 07:30), Max: 38.4 (16 Dec 2020 20:00)  T(F): 100 (17 Dec 2020 07:30), Max: 101.1 (16 Dec 2020 20:00)  HR: 137 (17 Dec 2020 10:00) (100 - 145)  BP: 82/56 (17 Dec 2020 10:00) (59/28 - 130/100)  BP(mean): 62 (17 Dec 2020 10:00) (34 - 115)  ABP: --  ABP(mean): --  RR: 20 (17 Dec 2020 10:00) (10 - 27)  SpO2: 92% (17 Dec 2020 08:20) (88% - 97%)      ABG - ( 16 Dec 2020 04:09 )  pH, Arterial: 7.42  pH, Blood: x     /  pCO2: 35    /  pO2: 103   / HCO3: 22    / Base Excess: -1.5  /  SaO2: 98                     @ 07:01  -   @ 07:00  --------------------------------------------------------  IN: 4875.5 mL / OUT: 570 mL / NET: 4305.5 mL        Mode: AC/ CMV (Assist Control/ Continuous Mandatory Ventilation)  RR (machine): 20  TV (machine): 450  FiO2: 100  PEEP: 5  ITime: 1  MAP: 17  PIP: 40      REVIEW OF SYSTEMS:    unable to obtain         PHYSICAL EXAM:  GENERAL: sedated and intubated   HEAD:  Atraumatic, Normocephalic  EYES: ENT: dry mucous membranes  NECK: Supple, No JVD  CHEST/LUNG: distant bs on L SIDE. rhonchi heard bl   HEART: Regular rate and rhythm; S1+ S2+  ABDOMEN: Bowel sounds present; Soft, Nontender, Nondistended.   EXTREMITIES:  2+ Peripheral Pulses, brisk capillary refill. cold extremities  NERVOUS SYSTEM: sedated and intubated   SKIN: bruising noted on arm     LABS:  CBC Full  -  ( 17 Dec 2020 06:05 )  WBC Count : 14.57 K/uL  RBC Count : 3.40 M/uL  Hemoglobin : 10.0 g/dL  Hematocrit : 34.3 %  Platelet Count - Automated : 218 K/uL  Mean Cell Volume : 100.9 fl  Mean Cell Hemoglobin : 29.4 pg  Mean Cell Hemoglobin Concentration : 29.2 gm/dL  Auto Neutrophil # : 13.34 K/uL  Auto Lymphocyte # : 0.53 K/uL  Auto Monocyte # : 0.59 K/uL  Auto Eosinophil # : 0.00 K/uL  Auto Basophil # : 0.01 K/uL  Auto Neutrophil % : 91.6 %  Auto Lymphocyte % : 3.6 %  Auto Monocyte % : 4.0 %  Auto Eosinophil % : 0.0 %  Auto Basophil % : 0.1 %        136  |  107  |  42<H>  ----------------------------<  99  6.5<HH>   |  14<L>  |  1.46<H>    Ca    8.2<L>      17 Dec 2020 06:05  Phos  6.8       Mg     2.0         TPro  6.6  /  Alb  1.8<L>  /  TBili  0.5  /  DBili  x   /  AST  3311<H>  /  ALT  1692<H>  /  AlkPhos  168<H>      PT/INR - ( 17 Dec 2020 06:05 )   PT: 37.3 sec;   INR: 3.32 ratio         PTT - ( 15 Dec 2020 16:02 )  PTT:26.1 sec  Urinalysis Basic - ( 15 Dec 2020 16:03 )    Color: Yellow / Appearance: Clear / S.020 / pH: x  Gluc: x / Ketone: Small  / Bili: Negative / Urobili: 1   Blood: x / Protein: 30 mg/dL / Nitrite: Negative   Leuk Esterase: Trace / RBC: 10-25 /HPF / WBC 6-10 /HPF   Sq Epi: x / Non Sq Epi: Few /HPF / Bacteria: Few /HPF      Culture Results:   No growth (12-15 @ 22:09)  Culture Results:   No growth to date. (12-15 @ 22:08)  Culture Results:   No growth to date. (12-15 @ 22:08)      RADIOLOGY & ADDITIONAL STUDIES REVIEWED:  ***  CXR: < from: Xray Chest 1 View- PORTABLE-Routine (Xray Chest 1 View- PORTABLE-Routine in AM.) (20 @ 08:22) >    EXAM:  XR CHEST PORTABLE ROUTINE 1V                            PROCEDURE DATE:  2020          INTERPRETATION:  CLINICAL STATEMENT: Follow-up chest pain.    TECHNIQUE: AP view of the chest.    COMPARISON: 2020    FINDINGS/  IMPRESSION:  ET tube and right central line again noted. No pneumothorax. Complete opacification left hemithorax unchanged.    Increased interstitial lung markings and opacities without significant change.    Heart size cannot be accurately assessed in this projection.              BRITTANY MONTOYA MD; Attending Radiologist  This document has been electronically signed. Dec 17 2020  9:52AM    < end of copied text >      [ ]GOALS OF CARE DISCUSSION WITH PATIENT/FAMILY/PROXY: DNR, NO ESCALATION OF CARE    CRITICAL CARE TIME SPENT: 35 minutes

## 2020-12-17 NOTE — PROGRESS NOTE ADULT - ATTENDING COMMENTS
IMP: This is a 75 yr old woman with DM, DVT, Uterine cancer (s/p PA, RTx5, in remission since 2016), anxiety, presented to ER for AMS characterized by disorientation and shortness of breath. Noted to be in respiratory distress in ER with hypercapnea on ABG as well as CXR with complete whiteout of left lung s/o large pleural effusion hence emergently intubated and ICU consulted for further care.       -  MOSF  -  Encephalopathy  -  Acute hypoxic hypercapnic respiratory failure  -  mediastinal adenopathy  -  metastatic disease ? primary  -  Pneumonia  -  Lactic acidosis  -  Anemia  -  Hyperkalemia        Plan:  -continue vent support  -adjust vent as per ABG  -pat is requiring 3 pressors to maintain BP  -continue pressors to maintain MAP>65  -f/u cultures  -sedated and pain control   -blood sugar control   -seay  -will need chest tube  -malignancy work up  -Onco eval noted : mets with ? source since never work up but may be uterine .. hx of uterine ca  -Palliative noted.. DNR and no escalation of care
IMP: This is a 75 yr old woman with DM, DVT, Uterine cancer (s/p PA, RTx5, in remission since 2016), anxiety, presented to ER for AMS characterized by disorientation and shortness of breath. Noted to be in respiratory distress in ER with hypercapnea on ABG as well as CXR with complete whiteout of left lung s/o large pleural effusion hence emergently intubated and ICU consulted for further care.     -  Encephalopathy  -  Acute hypoxic hypercapnic respiratory failure  -  mediastinal adenopathy  -  metastatic disease ? primary  -  Pneumonia  -  Lactic acidosis  -  Anemia  -  Hyperkalemia      Plan:  -continue vent support  -adjust vent as per ABG  -continue pressors to maintain MAP>65  -f/u cultures  -sedated and pain control   -blood sugar control   -seay  -will need chest tube  -malignancy work up  -oncology eval

## 2020-12-17 NOTE — PROGRESS NOTE ADULT - PROBLEM SELECTOR PLAN 6
See GOC section above. Patient's daughter/Dervin "MELONY" Jackson is identified surrogate. Patient now DNR / NO ESCALATION OF CARE. Grave prognosis; death likely to occur within hours to days.

## 2020-12-17 NOTE — PROGRESS NOTE ADULT - CONVERSATION DETAILS
12/17/20: Spoke with daughter on the phone. Discussed status; reviewed medications, labs, diagnostics. Daughter acknowledges patient in multi-organ failure, hypotensive despite 3 pressors and with grave prognosis. Reviewed oncology recommendations/findings of comfort care. Daughter states that she and her brother has discussed it and in regards to CPR they "don't want it." Agreeable for NO ESCALATION OF CARE. Aware death likely to occur within hours to days. Spiritual services offered but declined. All questions answered; supportive counseling provided. 12/17/20: Spoke with daughter on the phone. Discussed status; reviewed medications, labs, diagnostics. Daughter acknowledges patient in multi-organ failure, hypotensive despite 3 pressors and with grave prognosis. Reviewed oncology recommendations/findings of comfort care. Daughter states that she and her brother has discussed it and in regards to CPR they "don't want it." Agreeable for NO ESCALATION OF CARE. MOLST completed as per surrogate's wishes. Aware death likely to occur within hours to days. Spiritual services offered but declined. All questions answered; supportive counseling provided.

## 2020-12-17 NOTE — CONSULT NOTE ADULT - SUBJECTIVE AND OBJECTIVE BOX
Patient is a 75y old  Female who presents with a chief complaint of respiratory failure (17 Dec 2020 10:12)      HPI:  76yo female with DM, DVT, Uterine cancer (s/p PA, RTx5, in remission since 2016), anxiety, presented to ER for AMS. Patient in ER was noted to be in respiratory distress with hypercapnia on labs for which she was intubated. As per daughter, patient was diagnosed with DVT in July/August for which she has been on Eliquis. She later had a fracture of femur in same limb (?pathological fracture), requiring placement of mer. Patient underwent biopsy of bone at that time which was negative for malignancy however remained high suspicion. She had increasing pain, and was diagnosed with fracture of coccyx requiring cement repair following which she was transferred to rehab where she progressively deteriorated. Moreover her o2 sats were noted dropping. She was discharged from rehab on 5l NC about 4 days prior . As per daughter cause was not known. She reports having the patient's scans rechecked with PCP who noted pulmonary nodules and recommended lung biopsy. Patient was discharged to home on friday. She has been complaining of difficulty breathing with a noted cough productive of phlegm. Her breathing was getting labored with increasing disorientation. Patient last night kept removing her NC, found this AM to be lethargic with her o2 off, was disoriented and incoherent hence sent to ER. No reported episodes of fever.  (15 Dec 2020 20:16)she was intubated and has been on vent since.  She needs pressor.  CT showed large left effusion, mpleural masses, lung nodules, bone mets, and ?peritoneal carcinomatosis.       ROS:  Negative except for:    PAST MEDICAL & SURGICAL HISTORY:  Anxiety    DVT, lower extremity    Uterine cancer    DM (diabetes mellitus)    Smoker    Hepatitis C  ~2017, treated    Endometrial cancer    Diabetes mellitus    S/P PA-BSO    History of     History of endometrial biopsy        SOCIAL HISTORY:    FAMILY HISTORY:  No pertinent family history in first degree relatives        MEDICATIONS  (STANDING):  cefepime   IVPB 2000 milliGRAM(s) IV Intermittent every 8 hours  chlorhexidine 0.12% Liquid 15 milliLiter(s) Oral Mucosa every 12 hours  chlorhexidine 2% Cloths 1 Application(s) Topical <User Schedule>  fentaNYL   Infusion 2 MICROgram(s)/kG/Hr (17.8 mL/Hr) IV Continuous <Continuous>  insulin lispro (ADMELOG) corrective regimen sliding scale   SubCutaneous every 6 hours  norepinephrine Infusion 0.35 MICROgram(s)/kG/Min (29.3 mL/Hr) IV Continuous <Continuous>  pantoprazole   Suspension 40 milliGRAM(s) Enteral Tube daily  phenylephrine    Infusion 10 MICROgram(s)/kG/Min (167 mL/Hr) IV Continuous <Continuous>  polyethylene glycol 3350 17 Gram(s) Oral daily  vasopressin Infusion 0.04 Unit(s)/Min (2.4 mL/Hr) IV Continuous <Continuous>    MEDICATIONS  (PRN):  sodium chloride 0.9% lock flush 10 milliLiter(s) IV Push every 1 hour PRN Pre/post blood products, medications, blood draw, and to maintain line patency      Allergies    No Known Drug Allergies  raspberries (Angioedema)    Intolerances        Vital Signs Last 24 Hrs  T(C): 37.8 (17 Dec 2020 07:30), Max: 38.4 (16 Dec 2020 20:00)  T(F): 100 (17 Dec 2020 07:30), Max: 101.1 (16 Dec 2020 20:00)  HR: 137 (17 Dec 2020 10:00) (100 - 145)  BP: 82/56 (17 Dec 2020 10:00) (59/28 - 130/100)  BP(mean): 62 (17 Dec 2020 10:00) (34 - 115)  RR: 20 (17 Dec 2020 10:00) (10 - 27)  SpO2: 92% (17 Dec 2020 08:20) (88% - 97%)    PHYSICAL EXAM  General: adult in NAD  HEENT: clear oropharynx, anicteric sclera, pink conjunctiva  Neck: supple  CV: normal S1/S2 with no murmur rubs or gallops  Lungs: positive air movement b/l ant lungs,clear to auscultation, no wheezes, no rales  Abdomen: soft non-tender non-distended, no hepatosplenomegaly  Ext: no clubbing cyanosis or edema  Skin: no rashes and no petechiae  Neuro: alert and oriented X 4, no focal deficits      LABS:                          10.0   14.57 )-----------( 218      ( 17 Dec 2020 06:05 )             34.3         Mean Cell Volume : 100.9 fl  Mean Cell Hemoglobin : 29.4 pg  Mean Cell Hemoglobin Concentration : 29.2 gm/dL  Auto Neutrophil # : 13.34 K/uL  Auto Lymphocyte # : 0.53 K/uL  Auto Monocyte # : 0.59 K/uL  Auto Eosinophil # : 0.00 K/uL  Auto Basophil # : 0.01 K/uL  Auto Neutrophil % : 91.6 %  Auto Lymphocyte % : 3.6 %  Auto Monocyte % : 4.0 %  Auto Eosinophil % : 0.0 %  Auto Basophil % : 0.1 %      Serial CBC's   @ 06:05  Hct-34.3 / Hgb-10.0 / Plat-218 / RBC-3.40 / WBC-14.57  Serial CBC's   @ 05:11  Hct-32.4 / Hgb-9.7 / Plat-257 / RBC-3.41 / WBC-12.46  Serial CBC's  12-15 @ 16:02  Hct-36.8 / Hgb-11.0 / Plat-282 / RBC-3.83 / WBC-9.48          138  |  109<H>  |  44<H>  ----------------------------<  179<H>  6.0<H>   |  14<L>  |  1.63<H>    Ca    7.8<L>      17 Dec 2020 10:02  Phos  6.8       Mg     2.0         TPro  6.6  /  Alb  1.8<L>  /  TBili  0.5  /  DBili  x   /  AST  3311<H>  /  ALT  1692<H>  /  AlkPhos  168<H>        PT/INR - ( 17 Dec 2020 06:05 )   PT: 37.3 sec;   INR: 3.32 ratio         PTT - ( 15 Dec 2020 16:02 )  PTT:26.1 sec    Vitamin B12, Serum: 1050 pg/mL ( @ 13:12)  Folate, Serum: <2.0 ng/mL ( @ 13:12)  Ferritin, Serum: 1503 ng/mL ( @ 08:49)  Iron - Total Binding Capacity.: 212 ug/dL ( @ 05:25)              BLOOD SMEAR INTERPRETATION:       RADIOLOGY & ADDITIONAL STUDIES:  < from: CT Angio Chest w/ IV Cont (12.15.20 @ 22:15) >  LUNGS, AIRWAYS, PLEURA: An endotracheal tube is in satisfactory position. There is mucoid impaction of the left mainstem bronchus and complete left lung atelectasis/collapse in the setting of a large left pleural effusion. There is mucoid impaction of right lower lobe superior segmental bronchi. There is masslike left pleural enhancement and numerous enhancing pleural nodules, likely metastases. There is a small right pleural effusion with adjacent passive atelectasis, partially loculated along the right major fissure, with additional smaller enhancing pleural metastases. There is diffuse right bronchial wall thickening, including scattered lung nodules and nodular opacities, such as along the right major fissure measuring 2 cm on 6:106 and in the right lower lobe on 6:90 measuring 1.6 cm, concerning for metastases. There are superimposed patchy right lung groundglass opacities, septal thickening, and vascular dilatation, especially pronounced in the right upper lobe, concerning for pneumonia.    MEDIASTINUM AND YUE: There is mediastinal and bulky right hilar lymphadenopathy, with subcarinallymphadenopathy measuring 2.1 cm in short axis diameter. Calcifications in association with mediastinal nodes is likely due to old granulomatous disease. There are several mildly prominent cardiophrenic lymph nodes measuring up to 0.8 cm in short axis diameter.    VESSELS: No intraluminal filling defect is identified in the main, lobar, or segmental pulmonary arteries to suggest acute pulmonary thromboembolism. There is mild narrowing of right upper segmental pulmonary arteries due to compressionby adjacent bulky hilar lymphadenopathy. The aortic root and arch are normal in caliber. There are mild calcifications of the aortic root/valve, and irregular, ulcerative plaque involving the descending thoracic and upper abdominal aorta. A right IJ central line terminates in the lower SVC.    HEART: Heart size is normal. No pericardial effusion.    CHEST WALL AND LOWER NECK: Within normal limits.    VISUALIZED UPPER ABDOMEN: Trace ascites. Patchy areas of hepatic hyperattenuation may represent perfusion related changes. Dense calcifications in the periportal region presumably relate to granulomatous lymph nodes. There are findings of peritoneal carcinomatosis with stranding in the omentum, peritoneal thickening/enhancement, and multiple enhancing peritoneal soft tissue nodules. Multiple prominent gastrohepatic and retroperitoneal lymph nodes are also seen, likely metastatic. There is nonspecific thickening and hyperenhancement of the adrenal glands. There is a too small to characterize right renal hypodensity, potentially a cyst.    BONES: There is diffuse osseous metastatic disease, with involvement of the T1 and T6 vertebral bodies, including mild T6 pathologic wedge compression fracture with evidence of ventral epidural disease resulting in at least mild central spinal canal stenosis. There is lytic osseous destruction of the right posterior seventh and left posterior 11th ribs with associated soft tissue components. Additional lytic metastases are seen involving the sternal body and left scapula.    IMPRESSION:  Constellation of imaging findings compatible with malignant neoplasm (? bronchogenic or gastrointestinal carcinoma, less likely primary pleural malignancy) as characterized by masslike pleural thickening and pleural-based masses, primarily on the left, large left pleural effusion in association with complete left lung atelectasis/collapse, numerous right lung nodules and     < end of copied text >  < from: CT Angio Chest w/ IV Cont (12.15.20 @ 22:15) >  nodular opacities, as well as right hilar and mediastinal lymphadenopathy. Additional findings of osseous metastases, with lytic, destructive right posterior seventh and left posterior 11th rib lesions, as well as lytic involvement of the T1 and T6 vertebral bodies, including evidence of ventral epidural disease at T6 resulting in some degree of spinal canal stenosis. Further workup with contrast-enhanced MRI of the total spine is recommended to evaluate for neural element integrity.    Patchy right lung groundglass opacities, septal thickening, and vascular engorgement may be related to superimposed pneumonia, with special consideration to atypical etiology such as COVID-19.    No evidence of acute pulmonary thromboembolism.    Limited evaluation of the upper abdomen reveals findings concerning for peritoneal carcinomatosis, for which further workup with contrast-enhanced CT abdomen and pelvis is recommended.    < end of copied text >

## 2020-12-17 NOTE — PROGRESS NOTE ADULT - SUBJECTIVE AND OBJECTIVE BOX
INTERVAL HPI/OVER NIGHT EVENTS:  Intubated  On pressors, remains hypotensive    MEDICATIONS  (STANDING):  cefepime   IVPB 2000 milliGRAM(s) IV Intermittent every 8 hours  chlorhexidine 0.12% Liquid 15 milliLiter(s) Oral Mucosa every 12 hours  chlorhexidine 2% Cloths 1 Application(s) Topical <User Schedule>  fentaNYL   Infusion 2 MICROgram(s)/kG/Hr (17.8 mL/Hr) IV Continuous <Continuous>  insulin lispro (ADMELOG) corrective regimen sliding scale   SubCutaneous every 6 hours  norepinephrine Infusion 0.35 MICROgram(s)/kG/Min (29.3 mL/Hr) IV Continuous <Continuous>  pantoprazole   Suspension 40 milliGRAM(s) Enteral Tube daily  phenylephrine    Infusion 10 MICROgram(s)/kG/Min (167 mL/Hr) IV Continuous <Continuous>  polyethylene glycol 3350 17 Gram(s) Oral daily  vasopressin Infusion 0.04 Unit(s)/Min (2.4 mL/Hr) IV Continuous <Continuous>    MEDICATIONS  (PRN):  sodium chloride 0.9% lock flush 10 milliLiter(s) IV Push every 1 hour PRN Pre/post blood products, medications, blood draw, and to maintain line patency      Vital Signs Last 24 Hrs  T(C): 37.8 (17 Dec 2020 07:30), Max: 38.4 (16 Dec 2020 20:00)  T(F): 100 (17 Dec 2020 07:30), Max: 101.1 (16 Dec 2020 20:00)  HR: 138 (17 Dec 2020 08:45) (100 - 145)  BP: 94/78 (17 Dec 2020 08:45) (59/28 - 131/71)  BP(mean): 66 (17 Dec 2020 08:45) (34 - 115)  RR: 20 (17 Dec 2020 08:45) (10 - 27)  SpO2: 88% (17 Dec 2020 07:00) (88% - 98%)      I&O's Detail    16 Dec 2020 07:01  -  17 Dec 2020 07:00  --------------------------------------------------------  IN:    Enteral Tube Flush: 50 mL    FentaNYL: 560.7 mL    IV PiggyBack: 333.2 mL    IV PiggyBack: 200 mL    IV PiggyBack: 100 mL    Norepinephrine: 205.1 mL    Norepinephrine: 58.3 mL    Norepinephrine: 4.1 mL    Phenylephrine: 182.2 mL    Phenylephrine: 770 mL    Phenylephrine: 516.7 mL    Phenylephrine: 1840 mL    Vasopressin: 55.2 mL  Total IN: 4875.5 mL    OUT:    Voided (mL): 570 mL  Total OUT: 570 mL    Total NET: 4305.5 mL      17 Dec 2020 07:01  -  17 Dec 2020 09:18  --------------------------------------------------------  IN:    FentaNYL: 53.4 mL    IV PiggyBack: 50 mL    Norepinephrine: 66.8 mL    Phenylephrine: 334 mL    Vasopressin: 4.8 mL  Total IN: 509 mL    OUT:    Voided (mL): 0 mL  Total OUT: 0 mL    Total NET: 509 mL      LABS:                        10.0   14.57 )-----------( 218      ( 17 Dec 2020 06:05 )             34.3             12-17    136  |  107  |  42<H>  ----------------------------<  99  6.5<HH>   |  14<L>  |  1.46<H>    Ca    8.2<L>      17 Dec 2020 06:05  Phos  6.8     12-17  Mg     2.0     12-17    TPro  6.6  /  Alb  1.8<L>  /  TBili  0.5  /  DBili  x   /  AST  3311<H>  /  ALT  1692<H>  /  AlkPhos  168<H>  12-17

## 2020-12-17 NOTE — PROGRESS NOTE ADULT - ASSESSMENT
75F with known pulmonary nodules susp for carcinoma, large left pleural effusion, lymphadenopathy and omental carcinomatosis suggestive of metastatic disease. Lung vs GI primary    -Patient may benefit from placement of left pigtail catheter, however patient is currently unstable and family decision to proceed is still pending  -If safe, recommend holding lovenox doses for possible placement of pigtail catheter  -Remainder of care per ICU  
ASSESSMENT AND PLAN:  74yo female with DM, DVT, Uterine cancer (s/p PA, RTx5, in remission since 2016), anxiety, presented to ER for AMS characterized by disorientation and shortness of breath. Noted to be in respiratory distress in ER with hypercapnea on ABG as well as CXR with complete whiteout of left lung s/o large pleural effusion hence emergently intubated and ICU consulted for further care.     1. Encephalopathy  2. Acute hypoxic hypercapnic respiratory failure  3. Pneumonia  3. Lactic acidosis  4. Anemia  5. Hyperkalemia    =================== Neuro============================  Encephalopathy:  -presented with disorientation, lethargy  -likely toxic metabolic in setting of sepsis from pneumonia as well as co2 retention  -now sedated and intubated  -negative ct head    ================= Cardiovascular==========================  Shock:  -blood pressure noted to drop s/p intubation requiring pressor support  -shock likely in setting of sepsis  -On pressors levo, pheny and vasopressin  via RIJ line  -pt is DNR, NO escalation of care now     ================- Pulm=================================  Acute hypoxic hypercapnic respiratory failure:  -presented with respiratory distress,   -emergently intubated in ER   -CXR noted with opacification of left hemothorax s/o pleural effusion, pulm infiltrate or a different pathology  -on cefepime empirically to cover for pneumonia  -CTA chest showed large left pleural effusion with possible bronchial mass on left + Lad   -pt will not get the chest tube as pt is DNR, NO escalation of care now,     ==================ID===================================  Pneumonia:  -plan as above    ================= Nephro================================  Hyperkalemia:  -p/w potassium of 6.1m 5.8 on repeat bmp showed K 5   -k went up to 5.3->5.9  -pt is DNR, NO escalation of care now     Lactic acidosis:  -lactate noted 3.8 in setting of hypoxia and  shock  -lactate elevated in the setting of liver shock     =================GI====================================  -no issues      ================ Heme/onc==================================  Anemia:  -h/h noted 11/36,   -monitor h/h    DVT:  -pt is DNR, NO escalation of care now       =================Endocrine===============================  DM:  -no further finger sticks    ================= Skin/Catheters============================  No rashes. Peripheral IV lines. RIj central line    - =================Prophylaxis =============================  Protonix for  GI proph    ==================GOC==================================   DNR, NO escalation of care now 
ASSESSMENT AND PLAN:  74yo female with DM, DVT, Uterine cancer (s/p PA, RTx5, in remission since 2016), anxiety, presented to ER for AMS characterized by disorientation and shortness of breath. Noted to be in respiratory distress in ER with hypercapnea on ABG as well as CXR with complete whiteout of left lung s/o large pleural effusion hence emergently intubated and ICU consulted for further care.     1. Encephalopathy  2. Acute hypoxic hypercapnic respiratory failure  3. Pneumonia  3. Lactic acidosis  4. Anemia  5. Hyperkalemia    =================== Neuro============================  Encephalopathy:  -presented with disorientation, lethargy  -likely toxic metabolic in setting of sepsis from pneumonia as well as co2 retention  -now sedated and intubated  -negative ct head    ================= Cardiovascular==========================  Shock:  -blood pressure noted to drop s/p intubation requiring pressor support  -shock likely in setting of sepsis  -c/w pressors pheny and vasopressin  via RIJ line, maintain MAP>65    ================- Pulm=================================  Acute hypoxic hypercapnic respiratory failure:  -presented with respiratory distress,   -emergently intubated in ER   -CXR noted with opacification of left hemothorax s/o pleural effusion, pulm infiltrate or a different pathology  -will start on cefepime empirically to cover for pneumonia  -mick since pt with hx of pulmonary nodules, concern for malignancy   -CTA chest showed large left pleural effusion with possible bronchial mass on left + Lad   -Cardiothoracic consulted pt is for Chest tube 12/16 - holding AC today     ==================ID===================================  Pneumonia:  -plan as above    ================= Nephro================================  Hyperkalemia:  -p/w potassium of 6.1m 5.8 on repeat bmp showed K 5   -s/p d50+insulin,  -am repeat bmp    Lactic acidosis:  -lactate noted 3.8 in setting of hypoxia and  shock  -continue with pressor support  - holding ivf in the setting of large pleural effusion     =================GI====================================  -no issues  -NPO for chest tube summer    ================ Heme/onc==================================  Anemia:  -h/h noted 11/36,   -monitor h/h    DVT:  -pt on eliquis at home  -started on full dose lovenox for now-> holding for CT placement tomorrow    Hx of uterine cancer:  -s/p PA and RT in 2016, patient has not followed with oncologist since beginning of pandemic    =================Endocrine===============================  DM:  -pt on glipizide at home, hold OHA  -start HSS  -monitor fs q6hrs, f/u a1c    ================= Skin/Catheters============================  No rashes. Peripheral IV lines. RIj central line    - =================Prophylaxis =============================  lovenox for hx of DVT  Protonix for  GI proph    ==================GOC==================================   FULL CODE as discussed with daughter

## 2020-12-17 NOTE — PROGRESS NOTE ADULT - PROBLEM SELECTOR PLAN 3
diffuse mets in lung, pleura, bone and peritoneum. CT indicates CT chest indicates Constellation of imaging findings compatible with malignant neoplasm (? bronchogenic or gastrointestinal carcinoma, less likely primary pleural malignancy) as characterized by masslike pleural thickening and pleural-based masses, primarily on the left, + large left pleural effusion in association with complete left lung atelectasis/collapse, numerous right lung nodules and nodular opacities, as well as right hilar and mediastinal lymphadenopathy, + osseous metastases, with lytic, destructive right posterior seventh and left posterior 11th rib lesions, as well as lytic involvement of the T1 and T6 vertebral bodies, including evidence of ventral epidural disease at T6 resulting in some degree of spinal canal stenosis, + Patchy right lung groundglass opacities. Patient with diffuse mets in lung, pleura, bone and peritoneum as per oncology. CT indicates CT chest indicates Constellation of imaging findings compatible with malignant neoplasm (? bronchogenic or gastrointestinal carcinoma, less likely primary pleural malignancy) as characterized by masslike pleural thickening and pleural-based masses, primarily on the left, + large left pleural effusion in association with complete left lung atelectasis/collapse, numerous right lung nodules and nodular opacities, as well as right hilar and mediastinal lymphadenopathy, + osseous metastases, with lytic, destructive right posterior seventh and left posterior 11th rib lesions, as well as lytic involvement of the T1 and T6 vertebral bodies, including evidence of ventral epidural disease at T6 resulting in some degree of spinal canal stenosis, + Patchy right lung groundglass opacities. Grave prognosis. DNR / NO ESCALATION OF CARE.

## 2020-12-17 NOTE — PROGRESS NOTE ADULT - PROBLEM SELECTOR PLAN 5
Per daughter, patient returned home from [facility] on Friday and has since been minimally verbal 2/2 dyspnea, O2 dependent, + significant weight loss, increasing weakness, now requiring assistance with ADLs. At present, present with acute on chronic respiratory failure, + shock, +metastatic disease. Per daughter, patient returned home from [facility] on Friday and has since been minimally verbal 2/2 dyspnea, O2 dependent, + significant weight loss, increasing weakness, now requiring assistance with ADLs. At present, present with acute on chronic respiratory failure, + shock, +metastatic disease. Grave prognosis.

## 2020-12-17 NOTE — CHART NOTE - NSCHARTNOTEFT_GEN_A_CORE
76yo female with DM, DVT, Uterine cancer (s/p PA, RTx5, in remission since 2016), anxiety, presented to ER for AMS. Patient in ER was noted to be in respiratory distress with hypercapnia on labs for which she was intubated. As per daughter, patient was diagnosed with DVT in July/August for which she has been on Eliquis. She later had a fracture of femur in same limb (?pathological fracture), requiring placement of mer. Patient underwent biopsy of bone at that time which was negative for malignancy however remained high suspicion. She had increasing pain, and was diagnosed with fracture of coccyx requiring cement repair following which she was transferred to rehab where she progressively deteriorated. Moreover her o2 sats were noted dropping. She was discharged from rehab on 5l NC about 4 days prior . As per daughter cause was not known. She reports having the patient's scans rechecked with PCP who noted pulmonary nodules and recommended lung biopsy. Patient was discharged to home on friday. She has been complaining of difficulty breathing with a noted cough productive of phlegm. Her breathing was getting labored with increasing disorientation. Patient last night kept removing her NC, found this AM to be lethargic with her o2 off, was disoriented and incoherent hence sent to ER. No reported episodes of fever.   Pt's CT chest showed large left sided pleural effusion with possible mass and metastasis. Pt was intubated for respiratory distress and transferred to ICU. Patient was started on bs abx and started on pressors. Patient continued to deteriorate and was found to be in Multi-organ failure with metastatic disease; involving lungs (patient intubated); heart (on 3 pressors); liver (Alk Phos 168, AST 3311, ALT 1692). WBC 14.57, Lactate 9.9. CT chest indicates Constellation of imaging findings compatible with malignant neoplasm (? bronchogenic or gastrointestinal carcinoma, less likely primary pleural malignancy) as characterized by masslike pleural thickening and pleural-based masses, primarily on the left, + large left pleural effusion in association with complete left lung atelectasis/collapse, numerous right lung nodules and nodular opacities, as well as right hilar and mediastinal lymphadenopathy, + osseous metastases, with lytic, destructive right posterior seventh and left posterior 11th rib lesions, as well as lytic involvement of the T1 and T6 vertebral bodies, including evidence of ventral epidural disease at T6 resulting in some degree of spinal canal stenosis, + Patchy right lung groundglass opacities. Patient utilizes O2 at baseline per family report. Patient remains sedated/intubated, on IV abx; hypotensive despite 3 pressors. Patient's daughter is primary surrogate; Patient now DNR / NO ESCALATION OF CARE.

## 2020-12-17 NOTE — PHARMACOTHERAPY INTERVENTION NOTE - COMMENTS
Recommended to add the Stop Date for cefepime.
Pantoprazole 40 mg IV PUSH daily changed to 40 mg oral suspension daily

## 2020-12-17 NOTE — PROGRESS NOTE ADULT - SUBJECTIVE AND OBJECTIVE BOX
OVERNIGHT EVENTS: patient hypotensive despite 3 pressors, now in multi-organ failure. grave prognosis.     Present Symptoms:   Review of Systems: [Unable to obtain due to poor mentation    MEDICATIONS  (STANDING):  calcium gluconate IVPB 1 Gram(s) IV Intermittent once  cefepime   IVPB 2000 milliGRAM(s) IV Intermittent every 8 hours  chlorhexidine 0.12% Liquid 15 milliLiter(s) Oral Mucosa every 12 hours  chlorhexidine 2% Cloths 1 Application(s) Topical <User Schedule>  dextrose 50% Injectable 50 milliLiter(s) IV Push once  fentaNYL   Infusion 2 MICROgram(s)/kG/Hr (17.8 mL/Hr) IV Continuous <Continuous>  insulin lispro (ADMELOG) corrective regimen sliding scale   SubCutaneous every 6 hours  insulin regular  human recombinant. 10 Unit(s) IV Push once  norepinephrine Infusion 0.35 MICROgram(s)/kG/Min (29.3 mL/Hr) IV Continuous <Continuous>  pantoprazole   Suspension 40 milliGRAM(s) Enteral Tube daily  phenylephrine    Infusion 10 MICROgram(s)/kG/Min (167 mL/Hr) IV Continuous <Continuous>  polyethylene glycol 3350 17 Gram(s) Oral daily  sodium zirconium cyclosilicate 10 Gram(s) Oral once  vasopressin Infusion 0.04 Unit(s)/Min (2.4 mL/Hr) IV Continuous <Continuous>    MEDICATIONS  (PRN):  sodium chloride 0.9% lock flush 10 milliLiter(s) IV Push every 1 hour PRN Pre/post blood products, medications, blood draw, and to maintain line patency      PHYSICAL EXAM:  Vital Signs Last 24 Hrs  T(C): 37.8 (17 Dec 2020 07:30), Max: 38.4 (16 Dec 2020 20:00)  T(F): 100 (17 Dec 2020 07:30), Max: 101.1 (16 Dec 2020 20:00)  HR: 135 (17 Dec 2020 11:15) (100 - 145)  BP: 85/61 (17 Dec 2020 11:15) (59/28 - 130/100)  BP(mean): 66 (17 Dec 2020 11:15) (34 - 115)  RR: 20 (17 Dec 2020 11:15) (10 - 27)  SpO2: 92% (17 Dec 2020 08:20) (88% - 97%)  General: Patient not medically stable for full physical exam. Patient sedated/intubated, on 3 pressors.   Karnofsky Performance Score/Palliative Performance Status Version2:     20%    HEENT:   ET tube  Lungs: comfortable, on ventilator. Continues fentanyl  CV:    tachycardia, on 3 pressors  GI:   incontinent, NGT  :    seay  Musculoskeletal: patient sedated/intubated; dependent on ADLs  Skin: scrum stage 2  Neuro: patient now sedated/intubated, dependent on ADLs  Oral intake ability: unable/only mouth care    Diet: NPO      LABS:                          10.0   14.57 )-----------( 218      ( 17 Dec 2020 06:05 )             34.3         138  |  109<H>  |  44<H>  ----------------------------<  179<H>  6.0<H>   |  14<L>  |  1.63<H>    Ca    7.8<L>      17 Dec 2020 10:02  Phos  6.8       Mg     2.0         TPro  6.6  /  Alb  1.8<L>  /  TBili  0.5  /  DBili  x   /  AST  3311<H>  /  ALT  1692<H>  /  AlkPhos  168<H>      Urinalysis Basic - ( 15 Dec 2020 16:03 )    Color: Yellow / Appearance: Clear / S.020 / pH: x  Gluc: x / Ketone: Small  / Bili: Negative / Urobili: 1   Blood: x / Protein: 30 mg/dL / Nitrite: Negative   Leuk Esterase: Trace / RBC: 10-25 /HPF / WBC 6-10 /HPF   Sq Epi: x / Non Sq Epi: Few /HPF / Bacteria: Few /HPF        RADIOLOGY & ADDITIONAL STUDIES:   < from: Xray Chest 1 View- PORTABLE-Routine (Xray Chest 1 View- PORTABLE-Routine in AM.) (20 @ 08:22) >  EXAM:  XR CHEST PORTABLE ROUTINE 1V                        PROCEDURE DATE:  2020    INTERPRETATION:  CLINICAL STATEMENT: Follow-up chest pain.  TECHNIQUE: AP view of the chest.  COMPARISON: 2020  FINDINGS/  IMPRESSION:  ET tube and right central line again noted. No pneumothorax. Complete opacification left hemithorax unchanged.  Increased interstitial lung markings and opacities without significant change.  Heart size cannot be accurately assessed in this projection.  < end of copied text >      ADVANCE DIRECTIVES: MOLST: DNR / no escalation of care.    OVERNIGHT EVENTS: patient hypotensive despite 3 pressors, now in multi-organ failure. grave prognosis.     Present Symptoms:   Review of Systems: [Unable to obtain due to poor mentation    MEDICATIONS  (STANDING):  calcium gluconate IVPB 1 Gram(s) IV Intermittent once  cefepime   IVPB 2000 milliGRAM(s) IV Intermittent every 8 hours  chlorhexidine 0.12% Liquid 15 milliLiter(s) Oral Mucosa every 12 hours  chlorhexidine 2% Cloths 1 Application(s) Topical <User Schedule>  dextrose 50% Injectable 50 milliLiter(s) IV Push once  fentaNYL   Infusion 2 MICROgram(s)/kG/Hr (17.8 mL/Hr) IV Continuous <Continuous>  insulin lispro (ADMELOG) corrective regimen sliding scale   SubCutaneous every 6 hours  insulin regular  human recombinant. 10 Unit(s) IV Push once  norepinephrine Infusion 0.35 MICROgram(s)/kG/Min (29.3 mL/Hr) IV Continuous <Continuous>  pantoprazole   Suspension 40 milliGRAM(s) Enteral Tube daily  phenylephrine    Infusion 10 MICROgram(s)/kG/Min (167 mL/Hr) IV Continuous <Continuous>  polyethylene glycol 3350 17 Gram(s) Oral daily  sodium zirconium cyclosilicate 10 Gram(s) Oral once  vasopressin Infusion 0.04 Unit(s)/Min (2.4 mL/Hr) IV Continuous <Continuous>    MEDICATIONS  (PRN):  sodium chloride 0.9% lock flush 10 milliLiter(s) IV Push every 1 hour PRN Pre/post blood products, medications, blood draw, and to maintain line patency      PHYSICAL EXAM:  Vital Signs Last 24 Hrs  T(C): 37.8 (17 Dec 2020 07:30), Max: 38.4 (16 Dec 2020 20:00)  T(F): 100 (17 Dec 2020 07:30), Max: 101.1 (16 Dec 2020 20:00)  HR: 135 (17 Dec 2020 11:15) (100 - 145)  BP: 85/61 (17 Dec 2020 11:15) (59/28 - 130/100)  BP(mean): 66 (17 Dec 2020 11:15) (34 - 115)  RR: 20 (17 Dec 2020 11:15) (10 - 27)  SpO2: 92% (17 Dec 2020 08:20) (88% - 97%)  General: Patient not medically stable for full physical exam. Patient sedated/intubated, on 3 pressors.   Karnofsky Performance Score/Palliative Performance Status Version2:     10%    HEENT:   ET tube  Lungs: comfortable, on ventilator. Continues fentanyl  CV:    tachycardia, on 3 pressors  GI:   incontinent, NGT  :    seay  Musculoskeletal: patient sedated/intubated; dependent on ADLs  Skin: scrum stage 2  Neuro: patient now sedated/intubated, dependent on ADLs  Oral intake ability: unable/only mouth care    Diet: NPO      LABS:                          10.0   14.57 )-----------( 218      ( 17 Dec 2020 06:05 )             34.3         138  |  109<H>  |  44<H>  ----------------------------<  179<H>  6.0<H>   |  14<L>  |  1.63<H>    Ca    7.8<L>      17 Dec 2020 10:02  Phos  6.8       Mg     2.0         TPro  6.6  /  Alb  1.8<L>  /  TBili  0.5  /  DBili  x   /  AST  3311<H>  /  ALT  1692<H>  /  AlkPhos  168<H>      Urinalysis Basic - ( 15 Dec 2020 16:03 )    Color: Yellow / Appearance: Clear / S.020 / pH: x  Gluc: x / Ketone: Small  / Bili: Negative / Urobili: 1   Blood: x / Protein: 30 mg/dL / Nitrite: Negative   Leuk Esterase: Trace / RBC: 10-25 /HPF / WBC 6-10 /HPF   Sq Epi: x / Non Sq Epi: Few /HPF / Bacteria: Few /HPF        RADIOLOGY & ADDITIONAL STUDIES:   < from: Xray Chest 1 View- PORTABLE-Routine (Xray Chest 1 View- PORTABLE-Routine in AM.) (20 @ 08:22) >  EXAM:  XR CHEST PORTABLE ROUTINE 1V                        PROCEDURE DATE:  2020    INTERPRETATION:  CLINICAL STATEMENT: Follow-up chest pain.  TECHNIQUE: AP view of the chest.  COMPARISON: 2020  FINDINGS/  IMPRESSION:  ET tube and right central line again noted. No pneumothorax. Complete opacification left hemithorax unchanged.  Increased interstitial lung markings and opacities without significant change.  Heart size cannot be accurately assessed in this projection.  < end of copied text >      ADVANCE DIRECTIVES: MOLST: DNR / no escalation of care.

## 2020-12-17 NOTE — PROGRESS NOTE ADULT - PROBLEM SELECTOR PLAN 2
2/2 PNA. CT chest indicates + Patchy right lung groundglass opacities. Patient utilizes O2 at baseline per family report. Patient remains sedated/intubated, on IV abx, 3 pressors. Patient's daughter is primary surrogate; Full code. 2/2 PNA. WBC 14.57, Lactate 9.9. CT chest indicates + Patchy right lung groundglass opacities. Patient utilizes O2 at baseline per family report. Patient remains sedated/intubated, on IV abx, 3 pressors. Patient's daughter is primary surrogate; DNR / NO ESCALATION OF CARE.

## 2020-12-17 NOTE — PROGRESS NOTE ADULT - SURROGATE NAME
----- Message from Vida Anton MD sent at 7/20/2017  4:15 AM CDT -----  Her hemoglobin is increasing.  Will recommend checking a CBC, CMP, erythropoietin level and see me in one month    Component      Latest Ref Rng & Units 3/3/2017 7/14/2017   HGB      12.0 - 15.5 g/dL 15.0 16.8 (H)   HCT      36.0 - 46.5 % 45.1 49.9 (H)     Writer called pt, pt verbalized understanding and appt moved up to 8/21.  
Asad "MELONY" Jackson (dtr)

## 2020-12-17 NOTE — CONSULT NOTE ADULT - ASSESSMENT
75 year old lady with h/o uterine ca s/p surgery and RT 2016. presented with AMS, sob.  She was intubated and on vent since.  CT showed large left effusion, lung nodules, pleural based mass, bone mets,m and ?peritoneal carcinomatosis.    1. diffuse mets in lung, pleura, bone and peritoneum  probably from uterine ca  but can not r/o other primaries  pleural effusion can be sent for cytology  draining of effusion will help hypercapnic resp failure  2. DVT  was on eliquis, but now on lovenox  will continue  can hold for pigtail catheter  3. lung infiltrates  ?COVID, ?lymphangetic spread  4. shock with high lactate  on pressor  
76yo F with known pulmonary nodules susp for carcinoma, large left pleural effusion, lymphadenopathy and omental carcinomatosis suggestive of metastatic disease. Lung vs GI primary  Pt currently on bid lovenox for h/o DVT  1. Pt will benefit from left pigtail catheter  2. Recommend holding lovenox doses to place pigtail catheter in AM  3. Will follow  4. D/w Dr. Odell and agreed

## 2020-12-17 NOTE — PROGRESS NOTE ADULT - PROBLEM SELECTOR PLAN 1
2/2 PNA; comorbid shock, metastatic disease; involving lungs (patient intubated); heart (on 3 pressors); liver (CT chest indicates Constellation of imaging findings compatible with malignant neoplasm (? bronchogenic or gastrointestinal carcinoma, less likely primary pleural malignancy) as characterized by masslike pleural thickening and pleural-based masses, primarily on the left, + large left pleural effusion in association with complete left lung atelectasis/collapse, numerous right lung nodules and nodular opacities, as well as right hilar and mediastinal lymphadenopathy, + osseous metastases, with lytic, destructive right posterior seventh and left posterior 11th rib lesions, as well as lytic involvement of the T1 and T6 vertebral bodies, including evidence of ventral epidural disease at T6 resulting in some degree of spinal canal stenosis, + Patchy right lung groundglass opacities. Patient utilizes O2 at baseline per family report. Patient remains sedated/intubated, on IV abx, 3 pressors. Patient is hypotensive despite 3 pressors. Thoracic consult pending. Patient's daughter is primary surrogate; Full code. 2/2 PNA; comorbid shock, metastatic disease; involving lungs (patient intubated); heart (on 3 pressors); liver (Alk Phos 168, AST 3311, ALT 1692). WBC 14.57, Lactate 9.9. CT chest indicates Constellation of imaging findings compatible with malignant neoplasm (? bronchogenic or gastrointestinal carcinoma, less likely primary pleural malignancy) as characterized by masslike pleural thickening and pleural-based masses, primarily on the left, + large left pleural effusion in association with complete left lung atelectasis/collapse, numerous right lung nodules and nodular opacities, as well as right hilar and mediastinal lymphadenopathy, + osseous metastases, with lytic, destructive right posterior seventh and left posterior 11th rib lesions, as well as lytic involvement of the T1 and T6 vertebral bodies, including evidence of ventral epidural disease at T6 resulting in some degree of spinal canal stenosis, + Patchy right lung groundglass opacities. Patient utilizes O2 at baseline per family report. Patient remains sedated/intubated, on IV abx; hypotensive despite 3 pressors. Patient's daughter is primary surrogate; Patient now DNR / NO ESCALATION OF CARE.

## 2020-12-17 NOTE — CONSULT NOTE ADULT - CONSULT REASON
uterine ca, lung and bone mets,
Left pleural effusion
74 y/o F with suspected metastatic disease as per family report; admitted for shock/respiratory failure 2/2 PNA. LACE 10. Request to establish GOC.

## 2020-12-18 VITALS — RESPIRATION RATE: 24 BRPM

## 2020-12-18 PROCEDURE — 94002 VENT MGMT INPAT INIT DAY: CPT

## 2020-12-18 PROCEDURE — 84100 ASSAY OF PHOSPHORUS: CPT

## 2020-12-18 PROCEDURE — 93005 ELECTROCARDIOGRAM TRACING: CPT

## 2020-12-18 PROCEDURE — 84484 ASSAY OF TROPONIN QUANT: CPT

## 2020-12-18 PROCEDURE — 80048 BASIC METABOLIC PNL TOTAL CA: CPT

## 2020-12-18 PROCEDURE — 84443 ASSAY THYROID STIM HORMONE: CPT

## 2020-12-18 PROCEDURE — 99291 CRITICAL CARE FIRST HOUR: CPT | Mod: 25

## 2020-12-18 PROCEDURE — 82746 ASSAY OF FOLIC ACID SERUM: CPT

## 2020-12-18 PROCEDURE — 87086 URINE CULTURE/COLONY COUNT: CPT

## 2020-12-18 PROCEDURE — 94003 VENT MGMT INPAT SUBQ DAY: CPT

## 2020-12-18 PROCEDURE — 71275 CT ANGIOGRAPHY CHEST: CPT

## 2020-12-18 PROCEDURE — 82728 ASSAY OF FERRITIN: CPT

## 2020-12-18 PROCEDURE — 81001 URINALYSIS AUTO W/SCOPE: CPT

## 2020-12-18 PROCEDURE — 80053 COMPREHEN METABOLIC PANEL: CPT

## 2020-12-18 PROCEDURE — 87040 BLOOD CULTURE FOR BACTERIA: CPT

## 2020-12-18 PROCEDURE — 83540 ASSAY OF IRON: CPT

## 2020-12-18 PROCEDURE — 82607 VITAMIN B-12: CPT

## 2020-12-18 PROCEDURE — 71045 X-RAY EXAM CHEST 1 VIEW: CPT

## 2020-12-18 PROCEDURE — 83880 ASSAY OF NATRIURETIC PEPTIDE: CPT

## 2020-12-18 PROCEDURE — 82550 ASSAY OF CK (CPK): CPT

## 2020-12-18 PROCEDURE — 83550 IRON BINDING TEST: CPT

## 2020-12-18 PROCEDURE — 85610 PROTHROMBIN TIME: CPT

## 2020-12-18 PROCEDURE — 84466 ASSAY OF TRANSFERRIN: CPT

## 2020-12-18 PROCEDURE — 83036 HEMOGLOBIN GLYCOSYLATED A1C: CPT

## 2020-12-18 PROCEDURE — 85025 COMPLETE CBC W/AUTO DIFF WBC: CPT

## 2020-12-18 PROCEDURE — 83605 ASSAY OF LACTIC ACID: CPT

## 2020-12-18 PROCEDURE — 87641 MR-STAPH DNA AMP PROBE: CPT

## 2020-12-18 PROCEDURE — 82803 BLOOD GASES ANY COMBINATION: CPT

## 2020-12-18 PROCEDURE — 82009 KETONE BODYS QUAL: CPT

## 2020-12-18 PROCEDURE — 87640 STAPH A DNA AMP PROBE: CPT

## 2020-12-18 PROCEDURE — 85730 THROMBOPLASTIN TIME PARTIAL: CPT

## 2020-12-18 PROCEDURE — 36415 COLL VENOUS BLD VENIPUNCTURE: CPT

## 2020-12-18 PROCEDURE — 83735 ASSAY OF MAGNESIUM: CPT

## 2020-12-18 PROCEDURE — 70450 CT HEAD/BRAIN W/O DYE: CPT

## 2020-12-18 PROCEDURE — 31500 INSERT EMERGENCY AIRWAY: CPT

## 2020-12-18 PROCEDURE — 0225U NFCT DS DNA&RNA 21 SARSCOV2: CPT

## 2020-12-18 PROCEDURE — 86769 SARS-COV-2 COVID-19 ANTIBODY: CPT

## 2020-12-18 PROCEDURE — 80061 LIPID PANEL: CPT

## 2020-12-18 PROCEDURE — 87635 SARS-COV-2 COVID-19 AMP PRB: CPT

## 2020-12-18 PROCEDURE — 82962 GLUCOSE BLOOD TEST: CPT

## 2020-12-18 RX ADMIN — PHENYLEPHRINE HYDROCHLORIDE 167 MICROGRAM(S)/KG/MIN: 10 INJECTION INTRAVENOUS at 02:16

## 2020-12-18 RX ADMIN — FENTANYL CITRATE 17.8 MICROGRAM(S)/KG/HR: 50 INJECTION INTRAVENOUS at 02:16

## 2020-12-18 NOTE — DISCHARGE NOTE FOR THE EXPIRED PATIENT - HOSPITAL COURSE
76yo female with DM, DVT, Uterine cancer (s/p PA, RTx5, in remission since 2016), anxiety, presented to ER for AMS. Patient in ER was noted to be in respiratory distress with hypercapnia on labs for which she was intubated. Pt's CT chest showed large left sided pleural effusion with possible mass and metastasis. Pt was intubated for respiratory distress and transferred to ICU. Patient was started on bs abx and started on pressors. Patient continued to deteriorate and was found to be in Multi-organ failure with metastatic disease; involving lungs (patient intubated); heart (on 3 pressors); liver (Alk Phos 168, AST 3311, ALT 1692). WBC 14.57, Lactate 9.9. CT chest indicates Constellation of imaging findings compatible with malignant neoplasm (? bronchogenic or gastrointestinal carcinoma, less likely primary pleural malignancy) as characterized by masslike pleural thickening and pleural-based masses, primarily on the left, + large left pleural effusion in association with complete left lung atelectasis/collapse, numerous right lung nodules and nodular opacities, as well as right hilar and mediastinal lymphadenopathy, + osseous metastases, with lytic, destructive right posterior seventh and left posterior 11th rib lesions, as well as lytic involvement of the T1 and T6 vertebral bodies, including evidence of ventral epidural disease at T6 resulting in some degree of spinal canal stenosis, + Patchy right lung groundglass opacities. Pt was hypotensive despite 3 pressors. Pt  on 2020 at 4:39 AM

## 2020-12-20 LAB
CULTURE RESULTS: SIGNIFICANT CHANGE UP
CULTURE RESULTS: SIGNIFICANT CHANGE UP
SPECIMEN SOURCE: SIGNIFICANT CHANGE UP
SPECIMEN SOURCE: SIGNIFICANT CHANGE UP

## 2020-12-30 NOTE — ASU PATIENT PROFILE, ADULT - PRO INTERPRETER NEED 2
Pt pulled out PIV that was just put in this morning. Catheter tip still intact. Minimal bleeding noticed from IV site, area cleansed and applied pressure to help minimize bleeding and then band aid was put on.    English

## 2021-01-04 ENCOUNTER — APPOINTMENT (OUTPATIENT)
Dept: GYNECOLOGIC ONCOLOGY | Facility: CLINIC | Age: 76
End: 2021-01-04

## 2021-01-25 NOTE — PATIENT PROFILE ADULT - TOBACCO USE
Patient stated she needed a post op appointment on 02/03/21. Please advise on where to schedule or contact patient when available. Thank you~   Unknown if ever smoked

## 2021-07-06 NOTE — CHART NOTE - NSCHARTNOTEFT_GEN_A_CORE
Thoracic surgery consulted for evaluation of patient due to large left pleural effusion   Due to patients critical condition, family is agreeable for no escalation of care  No further thoracic surgery intervention warranted at this time appt for 7/9/21

## 2022-01-05 NOTE — ED ADULT NURSE NOTE - NSFALLRSKHARMRISK_ED_ALL_ED
INSURANCE COVERAGE REGARDING PAYMENT  FOR YOUR COLONOSCOPY        Colon Cancer is the second leading cause of death among cancers, per the American [employer] Society. It is preventable. Early detection is the key. Your doctor will determine which tests need to be done for prevention and/or treatment. However, colonoscopies can be performed for several reasons:     Screening To screen for any problems within the colon. In this case, the patient is not symptomatic and does not have a personal history of previous colon cancer/condition or abnormal findings. Billed as screening.   Surveillance To monitor for a previously diagnosed colon condition (such as polyps) or when performed at more frequent intervals than every ten years because the patient has a personal/family history of colonic polyps or colon cancer. Billed as diagnostic.   Diagnostic Patient with symptoms, used to evaluate the colon. Billed as diagnostic.       If during a screening colonoscopy, our physician finds an abnormality, performs a biopsy or polypectomy (removal of polyp), your insurance company may consider the procedure to be a diagnostic exam and no longer a screening procedure.     Every insurance company is different. We encourage you to call your insurance company regarding plan benefits. Generally, screening benefits and diagnostic benefits may be paid at different levels. Charges associated with anesthesia or type of facility may also be processed differently. This varies with each insurance company, so we want you to be aware of this prior to your procedure. You do not have to call your insurance company if you have Medicare. For an estimate of potential charges, you may call the [facility] Patient Contact Center at [phone], option 5.     The authorization staff at [facility] will contact your insurance company to check precertification requirements for your colonoscopy. However, precertification, which serves as notification  is never a guarantee of payment. If you have questions regarding precertification for your procedure, please contact your insurance company.   yes

## 2022-04-11 NOTE — ASU DISCHARGE PLAN (ADULT/PEDIATRIC) - DISCHARGE TO
RT Inhaler-Nebulizer Bronchodilator Protocol Note    There is a bronchodilator order in the chart from a provider indicating to follow the RT Bronchodilator Protocol and there is an Initiate RT Inhaler-Nebulizer Bronchodilator Protocol order as well (see protocol at bottom of note). CXR Findings:  XR CHEST PORTABLE    Result Date: 4/10/2022  1. Bibasilar airspace opacities potentially due to atelectasis, scarring, aspiration, and/or pneumonia. 2. Possible trace right pleural effusion. The findings from the last RT Protocol Assessment were as follows:   History Pulmonary Disease: Smoker 15 pack years or more  Respiratory Pattern: Regular pattern and RR 12-20 bpm  Breath Sounds: Slightly diminished and/or crackles  Cough: Strong, spontaneous, non-productive  Indication for Bronchodilator Therapy: Decreased or absent breath sounds  Bronchodilator Assessment Score: 3    Aerosolized bronchodilator medication orders have been revised according to the RT Inhaler-Nebulizer Bronchodilator Protocol below. Respiratory Therapist to perform RT Therapy Protocol Assessment initially then follow the protocol. Repeat RT Therapy Protocol Assessment PRN for score 0-3 or on second treatment, BID, and PRN for scores above 3. No Indications - adjust the frequency to every 6 hours PRN wheezing or bronchospasm, if no treatments needed after 48 hours then discontinue using Per Protocol order mode. If indication present, adjust the RT bronchodilator orders based on the Bronchodilator Assessment Score as indicated below. Use Inhaler orders unless patient has one or more of the following: on home nebulizer, not able to hold breath for 10 seconds, is not alert and oriented, cannot activate and use MDI correctly, or respiratory rate 25 breaths per minute or more, then use the equivalent nebulizer order(s) with same Frequency and PRN reasons based on the score.   If a patient is on this medication at home then do not decrease Frequency below that used at home. 0-3 - enter or revise RT bronchodilator order(s) to equivalent RT Bronchodilator order with Frequency of every 4 hours PRN for wheezing or increased work of breathing using Per Protocol order mode. 4-6 - enter or revise RT Bronchodilator order(s) to two equivalent RT bronchodilator orders with one order with BID Frequency and one order with Frequency of every 4 hours PRN wheezing or increased work of breathing using Per Protocol order mode. 7-10 - enter or revise RT Bronchodilator order(s) to two equivalent RT bronchodilator orders with one order with TID Frequency and one order with Frequency of every 4 hours PRN wheezing or increased work of breathing using Per Protocol order mode. 11-13 - enter or revise RT Bronchodilator order(s) to one equivalent RT bronchodilator order with QID Frequency and an Albuterol order with Frequency of every 4 hours PRN wheezing or increased work of breathing using Per Protocol order mode. Greater than 13 - enter or revise RT Bronchodilator order(s) to one equivalent RT bronchodilator order with every 4 hours Frequency and an Albuterol order with Frequency of every 2 hours PRN wheezing or increased work of breathing using Per Protocol order mode. RT to enter RT Home Evaluation for COPD & MDI Assessment order using Per Protocol order mode.     Electronically signed by Rachel Shukla RCP on 4/11/2022 at 5:49 PM Home

## 2022-06-15 NOTE — H&P PST ADULT - LIVING CHILDREN, OB PROFILE
Evelyn Pino is here today in treatment chair   L and will be here until approximately 1400.    Follow-up labs: (MIS RN ONLY) {none  Injections: None    Room Preference: Semi-private    Next Chemo RN ONLY (including labs and length of treatment):   Date: Around 0915 each visit as XRT at 830 am 06/22/22 Cisplatin , 06/29 Cisplatin HR 5 ;    07/06/22 Cisplatin Length: HR 5 ;07/13 Cisplatin HR 5; 07/20/ cisplatin hr 5; 07/27 Cisplatin HR 5; 08/03/22 HR 5      Disconnect: None    Mediport: - Yes    Treatment Changes: No    Other: None    Next MD Visit and treatment (including labs and length of treatment) *Remember to yellow dot Epic: Provider: Earl Leung MD  ( note has xrt at 830 am) Next appointment date 06/28/22 ; DELIA Palacios clearance labs prior on 07/18/22 ; Earl Leung followup only with labs prior and Future labs cbc,cmp, mg each time       Labs    Spreadsheet    Appointments     Diagnosis  Endometrial Cancer    Signed by: Ana Reed, RN 6/15/2022 8:50 AM _______________________    
2

## 2022-09-23 NOTE — H&P ADULT - I WAS PHYSICALLY PRESENT FOR THE KEY PORTIONS OF THE EVALUATION AND MANAGEMENT (E/M) SERVICE PROVIDED.  I AGREE WITH THE ABOVE HISTORY, PHYSICAL, AND PLAN WHICH I HAVE REVIEWED AND EDITED WHERE APPROPRIATE
Statement Selected Induction of labor-AROM/Induction of labor-Medicinal/External electronic FM/Meconium staining

## 2023-03-03 NOTE — ASU DISCHARGE PLAN (ADULT/PEDIATRIC) - "IF YOU OR YOUR GUARDIAN/FAMILY IS A SMOKER, IT IS IMPORTANT FOR YOUR HEALTH TO STOP SMOKING. PLEASE BE AWARE THAT SECOND HAND SMOKE IS ALSO HARMFUL."
L/M to R/S appt for 4/24 with Dana Quevedo  Brando Mitchell will not be at [facility] on that day  Dana Quevedo is only at [facility] Tuesday and Wednesday  She will now be in [place] on Monday, Thursday and Friday  If pt needs a day at [facility] that she will not be here, you can schedule w another PA   Left [place] # to R/S 
Statement Selected

## 2023-06-29 NOTE — ED ADULT NURSE NOTE - CAS EDN DISCHARGE INTERVENTIONS
left ACg18, CL right jugular/IV intact Azathioprine Counseling:  I discussed with the patient the risks of azathioprine including but not limited to myelosuppression, immunosuppression, hepatotoxicity, lymphoma, and infections.  The patient understands that monitoring is required including baseline LFTs, Creatinine, possible TPMP genotyping and weekly CBCs for the first month and then every 2 weeks thereafter.  The patient verbalized understanding of the proper use and possible adverse effects of azathioprine.  All of the patient's questions and concerns were addressed.

## 2024-12-05 NOTE — H&P PST ADULT - NEGATIVE ENMT SYMPTOMS
Hide Additional Notes?: No Detail Level: Zone no hearing difficulty/no nasal congestion/no sinus symptoms/no throat pain/no dysphagia/no ear pain/no post-nasal discharge